# Patient Record
Sex: FEMALE | Race: WHITE
[De-identification: names, ages, dates, MRNs, and addresses within clinical notes are randomized per-mention and may not be internally consistent; named-entity substitution may affect disease eponyms.]

---

## 2018-07-02 ENCOUNTER — HOSPITAL ENCOUNTER (EMERGENCY)
Dept: HOSPITAL 97 - ER | Age: 68
Discharge: HOME | End: 2018-07-02
Payer: COMMERCIAL
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
## 2018-07-02 DIAGNOSIS — Z88.1: ICD-10-CM

## 2018-07-02 DIAGNOSIS — S01.01XA: Primary | ICD-10-CM
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
## 2018-07-02 DIAGNOSIS — E11.9: ICD-10-CM

## 2018-07-02 DIAGNOSIS — F17.210: ICD-10-CM
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
## 2018-07-02 DIAGNOSIS — F41.9: ICD-10-CM

## 2018-07-02 DIAGNOSIS — Z23: ICD-10-CM

## 2018-07-02 PROCEDURE — 99283 EMERGENCY DEPT VISIT LOW MDM: CPT

## 2018-07-02 PROCEDURE — 90714 TD VACC NO PRESV 7 YRS+ IM: CPT

## 2018-07-02 PROCEDURE — 0JQ00ZZ REPAIR SCALP SUBCUTANEOUS TISSUE AND FASCIA, OPEN APPROACH: ICD-10-PCS

## 2018-07-02 NOTE — EDPHYS
Physician Documentation                                                                           

 [facility]                                                                

Name: Selina Arenas                                                                               

Age: 68 yrs                                                                                       

Sex: Female                                                                                       

: 1950                                                                                   

MRN: [id number]                                                                                   

Arrival Date: 2018                                                                          

Time: 17:28                                                                                       

Account#: [id number]                                                                            

Bed 14                                                                                            

Private MD: None, None                                                                            

ED Physician Cuauhtemoc Bauer                                                                      

HPI:                                                                                              

                                                                                             

18:41 This 68 yrs old  Female presents to ER via Ambulatory with complaints of       jr8 

      Laceration To Head.                                                                         

18:41 The patient has a laceration related to: direct blow to head. Onset: The                jr8 

      symptoms/episode began/occurred acutely, yesterday. Associated signs and symptoms: The      

      patient has no apparent associated signs or symptoms. The patient has not experienced       

      similar symptoms in the past. The patient has not recently seen a physician. Patient        

      stated that she was bent over and was going to stand up and hit head on object causing      

      laceration. Incident happened yesterday. Waited until today to come in to see if she        

      needed sutures. .                                                                           

                                                                                                  

Historical:                                                                                       

- Allergies:                                                                                      

17:41 Ciprofloxacin;                                                                            

- Home Meds:                                                                                      

17:41 metformin 500 mg Oral tab 1 tab 2 times per day [Active];                                 

      triamterene-hydrochlorothiazid 75-50 mg Oral tab 1 tab once daily [Active]; Clonazepam      

      Oral [Active]; Flexeril 10 mg Oral tab 1 tab 3 times per day [Active];                      

- PMHx:                                                                                           

17:41 Diabetes - NIDDM; Anxiety;                                                              hj  

- PSHx:                                                                                           

17:41 ; Tonsillectomy;                                                               hj  

                                                                                                  

- Immunization history:: Adult Immunizations up to date.                                          

- Social history:: Smoking status: Patient uses tobacco products, smokes one-half pack            

  cigarettes per day, Patient/guardian denies using alcohol.                                      

- Ebola Screening: : Patient negative for fever greater than or equal to 101.5 degrees            

  Fahrenheit, and additional compatible Ebola Virus Disease symptoms Patient denies               

  exposure to infectious person Patient denies travel to an Ebola-affected area in the            

  21 days before illness onset.                                                                   

                                                                                                  

                                                                                                  

ROS:                                                                                              

18:41 Eyes: Negative for injury, pain, redness, and discharge, ENT: Negative for injury,      jr8 

      pain, and discharge, Neck: Negative for injury, pain, and swelling, Cardiovascular:         

      Negative for chest pain, palpitations, and edema, Respiratory: Negative for shortness       

      of breath, cough, wheezing, and pleuritic chest pain, Abdomen/GI: Negative for              

      abdominal pain, nausea, vomiting, diarrhea, and constipation, Back: Negative for injury     

      and pain, MS/Extremity: Negative for injury and deformity, Neuro: Negative for              

      headache, weakness, numbness, tingling, and seizure.                                        

18:41 Skin: Positive for laceration(s), of the scalp.                                             

                                                                                                  

Exam:                                                                                             

18:41 Eyes:  Pupils equal round and reactive to light, extra-ocular motions intact.  Lids and jr8 

      lashes normal.  Conjunctiva and sclera are non-icteric and not injected.  Cornea within     

      normal limits.  Periorbital areas with no swelling, redness, or edema. ENT:  Nares          

      patent. No nasal discharge, no septal abnormalities noted.  Tympanic membranes are          

      normal and external auditory canals are clear.  Oropharynx with no redness, swelling,       

      or masses, exudates, or evidence of obstruction, uvula midline.  Mucous membranes           

      moist. Neck:  Trachea midline, no thyromegaly or masses palpated, and no cervical           

      lymphadenopathy.  Supple, full range of motion without nuchal rigidity, or vertebral        

      point tenderness.  No Meningismus. Cardiovascular:  Regular rate and rhythm with a          

      normal S1 and S2.  No gallops, murmurs, or rubs.  Normal PMI, no JVD.  No pulse             

      deficits. Respiratory:  Lungs have equal breath sounds bilaterally, clear to                

      auscultation and percussion.  No rales, rhonchi or wheezes noted.  No increased work of     

      breathing, no retractions or nasal flaring. Abdomen/GI:  Soft, non-tender, with normal      

      bowel sounds.  No distension or tympany.  No guarding or rebound.  No evidence of           

      tenderness throughout. Back:  No spinal tenderness.  No costovertebral tenderness.          

      Full range of motion. Skin:  Warm, dry with normal turgor.  Normal color with no            

      rashes, no lesions, and no evidence of cellulitis. MS/ Extremity:  Pulses equal, no         

      cyanosis.  Neurovascular intact.  Full, normal range of motion. Neuro:  Awake and           

      alert, GCS 15, oriented to person, place, time, and situation.  Cranial nerves II-XII       

      grossly intact.  Motor strength 5/5 in all extremities.  Sensory grossly intact.            

      Cerebellar exam normal.  Normal gait.                                                       

18:41 Head/face: Noted is a laceration(s), that is deep, 5 cm(s), of the  scalp, of the           

      semilunar laceration noted with dried blood covering laceration .                           

                                                                                                  

Vital Signs:                                                                                      

17:42  / 81; Pulse 107; Resp 18; Temp 98.1(O); Pulse Ox 98% on R/A; Weight 63.5 kg;     hj  

      Height 5 ft. 11 in. (180.34 cm); Pain 1/10;                                                 

17:42 Body Mass Index 19.52 (63.50 kg, 180.34 cm)                                             hj  

                                                                                                  

Laceration:                                                                                       

19:05 Wound Repair of 5cm ( 2.0in ) subcutaneous laceration to scalp. Irregularly shaped..    jr8 

      Minimal bleeding noted.. Distal neuro/vascular/tendon intact. Anesthesia: Local             

      anesthetic administered with 8 mls of 1% lidocaine. Wound prep: Extensive cleansing         

      with betadine, Wound irrigation with saline, Wound debrided moderately, Wound explored      

      extensively, Copious irrigation. Skin closed with 6 4-0 Prolene using interrupted           

      sutures and sterile technique. Patient tolerated well.                                      

                                                                                                  

MDM:                                                                                              

18:03 Patient medically screened.                                                             jr8 

19:06 Data reviewed: vital signs, nurses notes, and as a result, I will discharge patient.    jr8 

      Data interpreted: Pulse oximetry: on room air is 98 %. Interpretation: normal.              

      Counseling: I had a detailed discussion with the patient and/or guardian regarding: the     

      historical points, exam findings, and any diagnostic results supporting the                 

      discharge/admit diagnosis, the need for outpatient follow up, a family practitioner, to     

      return to the emergency department if symptoms worsen or persist or if there are any        

      questions or concerns that arise at home.                                                   

19:06 ED course: Given size and width of wound. Suture was needed along with proper cleaning  jr8 

      and debridement of wound. Explained to patient risks vs benefit of this. Patient good       

      with decision.                                                                              

                                                                                                  

Administered Medications:                                                                         

19:19 Drug: Tetanus-Diphtheria Toxoid Adult 0.5 ml {: IEV. Exp:         mg2 

      2020. Lot #: [id number]. } Route: IM; Site: right deltoid;                                 

19:22 Follow up: Response: No adverse reaction; Medication administered at discharge.         mg2 

                                                                                                  

                                                                                                  

Disposition:                                                                                      

                                                                                             

15:58 Co-signature as Attending Physician, Cuauhtemoc Bauer MD Available for consultation at    ps1 

      all times..                                                                                 

                                                                                                  

Disposition:                                                                                      

18 19:08 Discharged to Home. Impression: Laceration without foreign body of scalp.          

- Condition is Stable.                                                                            

- Discharge Instructions: Laceration Care, Adult.                                                 

- Prescriptions for Bactrim - 160 mg Oral Tablet - take 1 tablet by ORAL route              

  every 12 hours for 10 days; 20 tablet.                                                          

- Medication Reconciliation Form, Thank You Letter, Antibiotic Education, Prescription            

  Opioid Use form.                                                                                

- Follow up: Private Physician; When: 1 week; Reason: Wound Recheck, Recheck today's              

  complaints, Continuance of care, Staple/Suture removal, Re-evaluation by your                   

  physician.                                                                                      

- Problem is new.                                                                                 

- Symptoms have improved.                                                                         

                                                                                                  

                                                                                                  

                                                                                                  

Signatures:                                                                                       

Rell Mathis PA PA   jr8                                                  

Stephan Burnette, RN                      RN   hj                                                   

Cuauhtemoc Bauer MD MD   ps1                                                  

Pipo Ramires RN                    RN   mg2                                                  

                                                                                                  

Corrections: (The following items were deleted from the chart)                                    

                                                                                             

19:22 19:08 2018 19:08 Discharged to Home. Impression: Laceration without foreign body  mg2 

      of scalp. Condition is Stable. Forms are Medication Reconciliation Form, Thank You          

      Letter, Antibiotic Education, Prescription Opioid Use. Follow up: Private Physician;        

      When: 1 week; Reason: Wound Recheck, Recheck today's complaints, Continuance of care,       

      Staple/Suture removal, Re-evaluation by your physician. Problem is new. Symptoms have       

      improved. jr8                                                                               

                                                                                                  

**************************************************************************************************

## 2018-07-02 NOTE — ER
Nurse's Notes                                                                                     

 [facility]                                                                

Name: Selina Arenas                                                                               

Age: 68 yrs                                                                                       

Sex: Female                                                                                       

: 1950                                                                                   

MRN: [id number]                                                                                   

Arrival Date: 2018                                                                          

Time: 17:28                                                                                       

Account#: [id number]                                                                            

Bed 14                                                                                            

Private MD: None, None                                                                            

Diagnosis: Laceration without foreign body of scalp                                               

                                                                                                  

Presentation:                                                                                     

                                                                                             

17:36 Presenting complaint: Patient states: i fell  midnight at the bathroom and hit my   

      head, had a cut; denies LOC; felt a huge cut on the back of my head; denies headache,       

      denies nausea and vomiting;. Transition of care: patient was not received from another      

      setting of care. Complicating Factors: There are no complicating factors for this           

      patient. Onset of symptoms was 2018. Risk Assessment: Do you want to hurt          

      yourself or someone else? Patient reports no desire to harm self or others. Initial         

      Sepsis Screen: Does the patient meet any 2 criteria? No. Patient's initial sepsis           

      screen is negative. Does the patient have a suspected source of infection? No.              

      Patient's initial sepsis screen is negative. Care prior to arrival: None.                   

17:36 Method Of Arrival: Ambulatory                                                             

17:36 Acuity: EDITH 4                                                                           hj  

                                                                                                  

Triage Assessment:                                                                                

17:42 General: Appears in no apparent distress. uncomfortable, Behavior is calm, cooperative, hj  

      appropriate for age. Pain: Complains of pain in scalp. Injury Description: Laceration.      

                                                                                                  

Historical:                                                                                       

- Allergies:                                                                                      

17:41 Ciprofloxacin;                                                                            

- Home Meds:                                                                                      

17:41 metformin 500 mg Oral tab 1 tab 2 times per day [Active];                                 

      triamterene-hydrochlorothiazid 75-50 mg Oral tab 1 tab once daily [Active]; Clonazepam      

      Oral [Active]; Flexeril 10 mg Oral tab 1 tab 3 times per day [Active];                      

- PMHx:                                                                                           

17:41 Diabetes - NIDDM; Anxiety;                                                                

- PSHx:                                                                                           

17:41 ; Tonsillectomy;                                                               hj  

                                                                                                  

- Immunization history:: Adult Immunizations up to date.                                          

- Social history:: Smoking status: Patient uses tobacco products, smokes one-half pack            

  cigarettes per day, Patient/guardian denies using alcohol.                                      

- Ebola Screening: : Patient negative for fever greater than or equal to 101.5 degrees            

  Fahrenheit, and additional compatible Ebola Virus Disease symptoms Patient denies               

  exposure to infectious person Patient denies travel to an Ebola-affected area in the            

  21 days before illness onset.                                                                   

                                                                                                  

                                                                                                  

Screenin:42 Abuse screen: Denies threats or abuse. Denies injuries from another. Nutritional        hj  

      screening: No deficits noted. Tuberculosis screening: No symptoms or risk factors           

      identified. Fall Risk None identified.                                                      

                                                                                                  

Assessment:                                                                                       

17:42 Musculoskeletal: Reports pain in scalp.                                                 hj  

19:20 Injury Description: Laceration is clean, 0.5 to 2.5 cm long.                            mg2 

                                                                                                  

Vital Signs:                                                                                      

17:42  / 81; Pulse 107; Resp 18; Temp 98.1(O); Pulse Ox 98% on R/A; Weight 63.5 kg;     hj  

      Height 5 ft. 11 in. (180.34 cm); Pain 1/10;                                                 

17:42 Body Mass Index 19.52 (63.50 kg, 180.34 cm)                                             hj  

                                                                                                  

ED Course:                                                                                        

17:28 Patient arrived in ED.                                                                  mr  

17:28 None, None is Private Physician.                                                        mr  

17:39 Triage completed.                                                                       hj  

17:42 Arm band placed on right wrist.                                                         hj  

17:42 Patient has correct armband on for positive identification. Bed in low position. Call     

      light in reach. Side rails up X 1. Adult w/ patient.                                        

18:03 [facility]                                                               jrLaureano 

18:03 Cuauhtemoc Bauer MD is Attending Physician.                                             jrLaureano 

19:06 Pipo Ramires, MIKHAIL is Primary Nurse.                                                  mg2 

19:21 No provider procedures requiring assistance completed. Patient did not have IV access   mg2 

      during this emergency room visit.                                                           

                                                                                                  

Administered Medications:                                                                         

19:19 Drug: Tetanus-Diphtheria Toxoid Adult 0.5 ml {: [employer]. Exp:         mg2 

      2020. Lot #: [id number]. } Route: IM; Site: right deltoid;                                 

19:22 Follow up: Response: No adverse reaction; Medication administered at discharge.         mg2 

                                                                                                  

                                                                                                  

Outcome:                                                                                          

19:08 Discharge ordered by MD.                                                                jrLaureano 

19:21 Discharged to home ambulatory, with family.                                             mg2 

19:21 Condition: good                                                                             

19:21 Discharge instructions given to patient, family, Instructed on discharge instructions,      

      follow up and referral plans. medication usage, Demonstrated understanding of               

      instructions, follow-up care, medications, Prescriptions given X 1.                         

19:22 Patient left the ED.                                                                    mg2 

                                                                                                  

Signatures:                                                                                       

Alyssa Craven                                mr                                                   

RoszakRell PA PA   pam Guthrieuin, Stephan, RN                      RN   hj                                                   

Pipo Ramires RN                    RN   mg2                                                  

                                                                                                  

Corrections: (The following items were deleted from the chart)                                    

17:45 17:42 Pulse 107bpm; Resp 18bpm; Pulse Ox 98% RA; Temp 98.1F Oral; 63.5 kg; Height 5 ft. hj  

      11 in.; BMI: 19.5; Pain 1/10; hj                                                            

                                                                                                  

**************************************************************************************************

## 2018-10-01 ENCOUNTER — HOSPITAL ENCOUNTER (EMERGENCY)
Dept: HOSPITAL 97 - ER | Age: 68
Discharge: HOME | End: 2018-10-01
Payer: COMMERCIAL

## 2018-10-01 DIAGNOSIS — F41.9: ICD-10-CM

## 2018-10-01 DIAGNOSIS — L03.126: ICD-10-CM

## 2018-10-01 DIAGNOSIS — L03.116: Primary | ICD-10-CM

## 2018-10-01 DIAGNOSIS — Z88.1: ICD-10-CM

## 2018-10-01 DIAGNOSIS — F17.210: ICD-10-CM

## 2018-10-01 DIAGNOSIS — E11.9: ICD-10-CM

## 2018-10-01 LAB
ALBUMIN SERPL BCP-MCNC: 3.5 G/DL (ref 3.4–5)
ALP SERPL-CCNC: 82 U/L (ref 45–117)
ALT SERPL W P-5'-P-CCNC: 18 U/L (ref 12–78)
AST SERPL W P-5'-P-CCNC: 16 U/L (ref 15–37)
BUN BLD-MCNC: 20 MG/DL (ref 7–18)
CKMB CREATINE KINASE MB: 1.5 NG/ML (ref 0.3–3.6)
GLUCOSE SERPLBLD-MCNC: 159 MG/DL (ref 74–106)
HCT VFR BLD CALC: 39.4 % (ref 36–45)
INR BLD: 0.97
LIPASE SERPL-CCNC: 139 U/L (ref 73–393)
LYMPHOCYTES # SPEC AUTO: 1.9 K/UL (ref 0.7–4.9)
MCH RBC QN AUTO: 32.9 PG (ref 27–35)
MCV RBC: 95.1 FL (ref 80–100)
PMV BLD: 8 FL (ref 7.6–11.3)
POTASSIUM SERPL-SCNC: 4 MMOL/L (ref 3.5–5.1)
RBC # BLD: 4.15 M/UL (ref 3.86–4.86)
TROPONIN (EMERG DEPT USE ONLY): < 0.02 NG/ML (ref 0–0.04)
UA COMPLETE W REFLEX CULTURE PNL UR: (no result)

## 2018-10-01 PROCEDURE — 85610 PROTHROMBIN TIME: CPT

## 2018-10-01 PROCEDURE — 99285 EMERGENCY DEPT VISIT HI MDM: CPT

## 2018-10-01 PROCEDURE — 87040 BLOOD CULTURE FOR BACTERIA: CPT

## 2018-10-01 PROCEDURE — 93971 EXTREMITY STUDY: CPT

## 2018-10-01 PROCEDURE — 82550 ASSAY OF CK (CPK): CPT

## 2018-10-01 PROCEDURE — 80048 BASIC METABOLIC PNL TOTAL CA: CPT

## 2018-10-01 PROCEDURE — 84145 PROCALCITONIN (PCT): CPT

## 2018-10-01 PROCEDURE — 36415 COLL VENOUS BLD VENIPUNCTURE: CPT

## 2018-10-01 PROCEDURE — 93005 ELECTROCARDIOGRAM TRACING: CPT

## 2018-10-01 PROCEDURE — 83605 ASSAY OF LACTIC ACID: CPT

## 2018-10-01 PROCEDURE — 80076 HEPATIC FUNCTION PANEL: CPT

## 2018-10-01 PROCEDURE — 84484 ASSAY OF TROPONIN QUANT: CPT

## 2018-10-01 PROCEDURE — 85025 COMPLETE CBC W/AUTO DIFF WBC: CPT

## 2018-10-01 PROCEDURE — 96365 THER/PROPH/DIAG IV INF INIT: CPT

## 2018-10-01 PROCEDURE — 85730 THROMBOPLASTIN TIME PARTIAL: CPT

## 2018-10-01 PROCEDURE — 96375 TX/PRO/DX INJ NEW DRUG ADDON: CPT

## 2018-10-01 PROCEDURE — 81015 MICROSCOPIC EXAM OF URINE: CPT

## 2018-10-01 PROCEDURE — 82553 CREATINE MB FRACTION: CPT

## 2018-10-01 PROCEDURE — 83690 ASSAY OF LIPASE: CPT

## 2018-10-01 NOTE — RAD REPORT
EXAM DESCRIPTION:  US - Extremity Venous Uni Ltd - 10/1/2018 5:43 pm

 

CLINICAL HISTORY:  Left leg pain and swelling

 

Preliminary findings provided at the time of the study.

 

COMPARISON:  None.

 

TECHNIQUE:  Real-time sonographic evaluation of the left lower extremity deep venous system was perfo
rmed.

 

FINDINGS:  Normal compressibility, flow augmentation, phasic flow and spontaneous flow are identified
 in the left lower extremity common femoral, superficial femoral, popliteal and posterior tibial vein
s. No intraluminal filling defects seen.

 

IMPRESSION:  No DVT in the left lower extremity.

## 2018-10-01 NOTE — ER
Nurse's Notes                                                                                     

 [facility]                                                                

Name: Selina Arenas                                                                               

Age: 68 yrs                                                                                       

Sex: Female                                                                                       

: 1950                                                                                   

MRN: [id number]                                                                                   

Arrival Date: 10/01/2018                                                                          

Time: 15:35                                                                                       

Account#: [id number]                                                                            

Bed 15                                                                                            

Private MD: None, None                                                                            

Diagnosis: Cellulitis and acute lymphangitis of other parts of limb                               

                                                                                                  

Presentation:                                                                                     

10/01                                                                                             

15:46 Presenting complaint: Patient states: Redness and swelling to top of left ankle and     hb  

      foot x 2 days. Transition of care: patient was not received from another setting of         

      care. Onset of symptoms was 2018. Risk Assessment: Do you want to hurt        

      yourself or someone else? Patient reports no desire to harm self or others. Care prior      

      to arrival: None.                                                                           

15:46 Method Of Arrival: Ambulatory                                                           hb  

15:46 Acuity: EDITH 2                                                                           hb  

15:59 Initial Sepsis Screen: Does the patient meet any 2 criteria? RR > 20 per min. HR > 90   tw2 

      bpm. Does the patient have a suspected source of infection? Yes: Skin breakdown/wound       

      If YES to both, name of provider notified: Kolby Dubose MD                              

                                                                                                  

Historical:                                                                                       

- Allergies:                                                                                      

15:49 Ciprofloxacin;                                                                          hb  

- Home Meds:                                                                                      

15:49 Clonazepam Oral [Active]; Flexeril 10 mg Oral tab 1 tab 3 times per day [Active];       hb  

      metformin 500 mg Oral tab 1 tab 2 times per day [Active];                                   

      triamterene-hydrochlorothiazid 75-50 mg Oral tab 1 tab once daily [Active];                 

- PMHx:                                                                                           

15:49 Diabetes - NIDDM; Anxiety;                                                              hb  

- PSHx:                                                                                           

15:49 ; Tonsillectomy;                                                               hb  

                                                                                                  

- Immunization history:: Adult Immunizations up to date.                                          

- Social history:: Smoking status: Patient uses tobacco products, smokes one-half pack            

  cigarettes per day, Patient/guardian denies using alcohol, street drugs,                        

  Patient/guardian denies using The patient lives with family.                                    

- Ebola Screening: : No symptoms or risks identified at this time.                                

- Family history:: not pertinent.                                                                 

                                                                                                  

                                                                                                  

Screening:                                                                                        

15:58 Abuse screen: Denies threats or abuse. Nutritional screening: No deficits noted.        tw2 

      Tuberculosis screening: No symptoms or risk factors identified. Fall Risk None              

      identified.                                                                                 

                                                                                                  

Assessment:                                                                                       

16:08 Reassessment: Dr Dubose at bedside at this time. General: Appears in no apparent       tw2 

      distress. obese, Behavior is anxious. Pain: Complains of pain in b/l feet. Neuro: Level     

      of Consciousness is awake, alert, obeys commands, Oriented to person, place, time,          

      situation. Cardiovascular: Denies chest pain, shortness of breath, Heart tones S1 S2        

      Capillary refill < 3 seconds Patient's skin is warm and dry. Respiratory: Airway is         

      patent Respiratory effort is even, unlabored, Respiratory pattern is regular,               

      symmetrical, Breath sounds are clear bilaterally. GI: No signs and/or symptoms were         

      reported involving the gastrointestinal system. Abdomen is round obese, Bowel sounds        

      present X 4 quads. : No signs and/or symptoms were reported regarding the                 

      genitourinary system. EENT: No signs and/or symptoms were reported regarding the EENT       

      system. Derm: "psoriasis all over but this has gotten really red and irritated since i      

      flew for 6 hours the other day". Musculoskeletal: Range of motion: intact in all            

      extremities.                                                                                

16:13 Cardiovascular: Edema is 1+ to left foot, left toes, right foot and right toes.         tw2 

17:31 Reassessment: Patient appears in no apparent distress at this time. No changes from     tw2 

      previously documented assessment. Patient and/or family updated on plan of care and         

      expected duration. Pain level reassessed. Patient is alert, oriented x 3, equal             

      unlabored respirations, skin warm/dry/pink.                                                 

17:50 Reassessment: Patient and/or family updated on plan of care and expected duration. Pain aa5 

      level reassessed. Patient is alert, oriented x 3, equal unlabored respirations, skin        

      warm/dry/pink. Patient denies pain at this time. Pt awaiting US results at this time. .     

17:50 Cardiovascular: Rhythm is atrial fibrillation.                                          aa5 

18:45 Reassessment: Patient and/or family updated on plan of care and expected duration. Pain aa5 

      level reassessed. Patient is alert, oriented x 3, equal unlabored respirations, skin        

      warm/dry/pink. Cardiovascular: Rhythm is atrial fibrillation.                               

19:33 Reassessment: Patient appears in no apparent distress at this time. Patient is alert,   aa1 

      oriented x 3, equal unlabored respirations, skin warm/dry/pink. ERP at bedside              

      discussing results with pt. Verbalizes understanding of d/c \T\ f/u instructions; denies    

      questions or concerns at this time Patient denies pain at this time.                        

                                                                                                  

Vital Signs:                                                                                      

15:47  / 95; Pulse 121; Resp 20; Temp 98.8; Pulse Ox 98% on R/A; Pain 0/10;             hb  

16:38 Weight 113.4 kg (R);                                                                    tw2 

16:38  / 76; Pulse 105; Resp 23; Pulse Ox 95% ;                                         tw2 

17:31  / 99; Pulse 93; Resp 19; Pulse Ox 95% on R/A;                                    tw2 

19:33  / 91; Pulse 98; Resp 18; Pulse Ox 96% on R/A; Pain 0/10;                         aa1 

                                                                                                  

ED Course:                                                                                        

15:35 Patient arrived in ED.                                                                  sb2 

15:36 None, None is Private Physician.                                                        sb2 

15:47 Triage completed.                                                                       hb  

15:48 Arm band placed on right wrist.                                                         hb  

15:58 Sandy Zelaya RN is Primary Nurse.                                                        tw2 

15:59 Kolby Dubose MD is Attending Physician.                                           ma2 

15:59 Bed in low position. Call light in reach. Cardiac monitor on. Pulse ox on. NIBP on.     tw2 

16:30 Initial lab(s) drawn, by me, sent to lab. First set of blood cultures drawn by me,      [facility] 

      Second set of blood cultures drawn by me.                                                   

16:44 Inserted saline lock: 20 gauge in right antecubital area, using aseptic technique.      mh5 

      Blood collected.                                                                            

16:47 Basic Metabolic Panel Sent.                                                             mh5 

16:47 Blood Culture Adult (2) Sent.                                                           mh5 

16:47 CBC with Diff Sent.                                                                     mh5 

16:47 Ckmb Sent.                                                                              mh5 

16:47 CPK Sent.                                                                               mh5 

16:47 Lactate Sent.                                                                           mh5 

16:47 LFT's Sent.                                                                             mh5 

16:47 Lipase Sent.                                                                            mh5 

16:48 Ptt, Activated Sent.                                                                    mh5 

16:48 Troponin (emerg Dept Use Only) Sent.                                                    mh5 

17:31 Report given to MIKHAIL Vargas.                                                               tw2 

17:45 Extremity Venous Uni Ltd US In Process Unspecified.                                     EDMS

18:28 Urine collected: clean catch specimen, cloudy, sediment noted.                          dh3 

19:10 Report given to MIKHAIL Fajardo.                                                             aa5 

19:17 EKG done, by ED staff, reviewed by Kolby Dubose MD.                                 dh3 

19:33 No provider procedures requiring assistance completed. IV discontinued, intact,         aa1 

      bleeding controlled, No redness/swelling at site. Pressure dressing applied.                

                                                                                                  

Administered Medications:                                                                         

16:53 Drug: NS 0.9% (30 ml/kg) 30 ml/kg {Note: 2L NS per Dr. Dubose at this time..} Route:   tw2 

      IV; Rate: bolus; Site: right antecubital;                                                   

17:04 Drug: Clindamycin 600 mg Route: IVPB; Infused Over: 30 mins; Site: right antecubital;   tw2 

17:32 Follow up: Response: No adverse reaction; IV Status: Completed infusion                 tw2 

                                                                                                  

                                                                                                  

Outcome:                                                                                          

19:19 Discharge ordered by MD.                                                                ma2 

19:33 Discharged to home ambulatory, with significant other.                                  aa1 

19:33 Condition: good                                                                             

19:33 Discharge instructions given to patient, significant other, Instructed on discharge         

      instructions, follow up and referral plans. medication usage, Demonstrated                  

      understanding of instructions, follow-up care, medications, Prescriptions given X 2.        

19:37 Patient left the ED.                                                                    aa1 

                                                                                                  

Signatures:                                                                                       

Dispatcher [employer]                           EDMS                                                 

Tana Aceves RN                        RN   aa1                                                  

Alicia De Los Santos RN                     RN   aa5                                                  

Varsha Sr RN RN hb Wise, Tara, RN RN   tw2                                                  

Alyssa Armstrong                              5                                                  

Mona Sotelo                              3                                                  

Kolby Dubose MD MD   ma2                                                  

Irasema Sexton                               sb2                                                  

                                                                                                  

Corrections: (The following items were deleted from the chart)                                    

15:48 15:46 Acuity: EDITH 3 hb                                                                  hb  

                                                                                                  

**************************************************************************************************

## 2018-10-01 NOTE — EDPHYS
Physician Documentation                                                                           

 [facility]                                                                

Name: Selina Arenas                                                                               

Age: 68 yrs                                                                                       

Sex: Female                                                                                       

: 1950                                                                                   

MRN: [id number]                                                                                   

Arrival Date: 10/01/2018                                                                          

Time: 15:35                                                                                       

Account#: [id number]                                                                            

Bed 15                                                                                            

Private MD: None, None                                                                            

ED Physician Kolby Dubose                                                                    

HPI:                                                                                              

10/01                                                                                             

19:20 This 68 yrs old  Female presents to ER via Ambulatory with complaints of Foot  ma2 

      infection.                                                                                  

17:09 The patient presents with cellulitis of the left leg. Description: hot, raised,         ma2 

      swollen. Onset: The symptoms/episode began/occurred gradually, 1 day(s) ago. Possible       

      cause(s): unknown. Associated signs and symptoms: Pertinent positives: erythema,            

      swelling, Pertinent negatives: fever, headache. Severity of symptoms: At their worst        

      the symptoms were mild, moderate, in the emergency department the symptoms are              

      unchanged. The patient has not experienced similar symptoms in the past. hx of DM here      

      with left leg swelling and redness x 1 day, had a flight from [place] last week, n           

      ofever or sob .                                                                             

                                                                                                  

Historical:                                                                                       

- Allergies:                                                                                      

15:49 Ciprofloxacin;                                                                          hb  

- Home Meds:                                                                                      

15:49 Clonazepam Oral [Active]; Flexeril 10 mg Oral tab 1 tab 3 times per day [Active];       hb  

      metformin 500 mg Oral tab 1 tab 2 times per day [Active];                                   

      triamterene-hydrochlorothiazid 75-50 mg Oral tab 1 tab once daily [Active];                 

- PMHx:                                                                                           

15:49 Diabetes - NIDDM; Anxiety;                                                              hb  

- PSHx:                                                                                           

15:49 ; Tonsillectomy;                                                               hb  

                                                                                                  

- Immunization history:: Adult Immunizations up to date.                                          

- Social history:: Smoking status: Patient uses tobacco products, smokes one-half pack            

  cigarettes per day, Patient/guardian denies using alcohol, street drugs,                        

  Patient/guardian denies using The patient lives with family.                                    

- Ebola Screening: : No symptoms or risks identified at this time.                                

- Family history:: not pertinent.                                                                 

                                                                                                  

                                                                                                  

ROS:                                                                                              

17:11 Constitutional: Negative for fever, chills, and weight loss, Eyes: Negative for injury, ma2 

      pain, redness, and discharge, Cardiovascular: Negative for chest pain, palpitations,        

      and edema, Respiratory: Negative for shortness of breath, cough, wheezing, and              

      pleuritic chest pain, Abdomen/GI: Negative for abdominal pain, nausea, diarrhea, and        

      constipation.                                                                               

17:11 MS/extremity: Positive for pain, swelling, warmth, Negative for injury or acute             

      deformity, bite.                                                                            

17:11 All other systems are negative.                                                             

                                                                                                  

Exam:                                                                                             

17:11 Constitutional:  This is a well developed, well nourished patient who is awake, alert,  ma2 

      and in no acute distress. Head/Face:  Normocephalic, atraumatic. Chest/axilla:  Normal      

      chest wall appearance and motion.  Nontender with no deformity.  No lesions are             

      appreciated. Cardiovascular:  Regular rate and rhythm with a normal S1 and S2.  No          

      gallops, murmurs, or rubs.  Normal PMI, no JVD.  No pulse deficits. Respiratory:  Lungs     

      have equal breath sounds bilaterally, clear to auscultation and percussion.  No rales,      

      rhonchi or wheezes noted.  No increased work of breathing, no retractions or nasal          

      flaring. Abdomen/GI:  Soft, non-tender, with normal bowel sounds.  No distension or         

      tympany.  No guarding or rebound.  No evidence of tenderness throughout.                    

17:11 Musculoskeletal/extremity: ROM: no acute changes, Circulation is intact in all              

      extremities. Sensation intact. Compartment Syndrome exam of affected extremity: is          

      normal. left foot dorsal erythema, edema and warmth, no fluctuance, area is 5x5 cm,         

      extend to the ankle, ankle joint wnl, rom is full and pain free.                            

                                                                                                  

Vital Signs:                                                                                      

15:47  / 95; Pulse 121; Resp 20; Temp 98.8; Pulse Ox 98% on R/A; Pain 0/10;             hb  

16:38 Weight 113.4 kg (R);                                                                    tw2 

16:38  / 76; Pulse 105; Resp 23; Pulse Ox 95% ;                                         tw2 

17:31  / 99; Pulse 93; Resp 19; Pulse Ox 95% on R/A;                                    tw2 

19:33  / 91; Pulse 98; Resp 18; Pulse Ox 96% on R/A; Pain 0/10;                         aa1 

                                                                                                  

MDM:                                                                                              

16:03 Patient medically screened.                                                             ma2 

17:11 Differential diagnosis: cellulitis, insect bite, DVT, cellulitis, sepsis .              ma2 

19:09 Data reviewed: vital signs, nurses notes.                                               ma2 

19:18 Counseling: I had a detailed discussion with the patient and/or guardian regarding: the ma2 

      historical points, exam findings, and any diagnostic results supporting the                 

      discharge/admit diagnosis, the presence of at least one elevated blood pressure reading     

      (>120/80) during this emergency department visit, the need for outpatient follow up.        

      Response to treatment: the patient's symptoms have mildly improved after treatment. ED      

      course: she is mildly tachy d/t anxiety, appropriate for outpatient management .            

                                                                                                  

10/01                                                                                             

16:18 Order name: Basic Metabolic Panel; Complete Time: 17:39                                 ma2 

10/01                                                                                             

16:18 Order name: Blood Culture Adult (2)                                                     ma2 

10/01                                                                                             

16:18 Order name: CBC with Diff; Complete Time: 17:39                                         ma2 

10/01                                                                                             

16:18 Order name: Ckmb; Complete Time: 17:39                                                  ma2 

10/01                                                                                             

16:18 Order name: CPK; Complete Time: 17:39                                                   ma2 

10/01                                                                                             

16:18 Order name: Lactate; Complete Time: 17:39                                               ma2 

10/01                                                                                             

16:18 Order name: LFT's; Complete Time: 17:39                                                 ma2 

10/01                                                                                             

16:18 Order name: Lipase; Complete Time: 17:39                                                ma2 

10/01                                                                                             

16:18 Order name: Procalcitonin; Complete Time: 17:39                                         ma2 

10/01                                                                                             

16:18 Order name: Protime (+inr); Complete Time: 17:39                                        ma2 

10/01                                                                                             

16:18 Order name: Ptt, Activated; Complete Time: 17:39                                        ma2 

10/01                                                                                             

16:18 Order name: Troponin (emerg Dept Use Only); Complete Time: 17:39                        ma2 

10/01                                                                                             

16:18 Order name: Urine Microscopic Only                                                      ma2 

10/01                                                                                             

16:18 Order name: Extremity Venous Uni Ltd US                                                 ma2 

10/01                                                                                             

16:18 Order name: Cardiac monitoring; Complete Time: 16:59                                    ma2 

10/01                                                                                             

16:18 Order name: EKG - Nurse/Tech; Complete Time: 16:59                                      ma2 

10/01                                                                                             

16:18 Order name: IV Saline Lock - Large Bore; Complete Time: 16:59                           ma2 

10/01                                                                                             

16:18 Order name: Labs collected and sent; Complete Time: 16:48                               ma2 

10/01                                                                                             

16:18 Order name: O2 Per Protocol; Complete Time: 16:59                                       ma2 

10/01                                                                                             

16:18 Order name: O2 Sat Monitoring; Complete Time: 16:59                                     ma2 

10/01                                                                                             

16:18 Order name: Urine Dipstick-Ancillary (obtain specimen); Complete Time: 18:28            [facility] 

                                                                                                  

Administered Medications:                                                                         

16:53 Drug: NS 0.9% (30 ml/kg) 30 ml/kg {Note: 2L NS per Dr. Dubose at this time..} Route:   tw2 

      IV; Rate: bolus; Site: right antecubital;                                                   

17:04 Drug: Clindamycin 600 mg Route: IVPB; Infused Over: 30 mins; Site: right antecubital;   tw2 

17:32 Follow up: Response: No adverse reaction; IV Status: Completed infusion                 tw2 

                                                                                                  

                                                                                                  

Disposition:                                                                                      

10/01/18 19:19 Discharged to Home. Impression: Cellulitis and acute lymphangitis of other         

  parts of limb.                                                                                  

- Condition is Stable.                                                                            

- Discharge Instructions: Cellulitis, Adult.                                                      

- Prescriptions for Augmentin 875- 125 mg Oral Tablet - take 1 tablet by ORAL route               

  every 12 hours for 10 days; 20 tablet. Clindamycin HCl 300 mg Oral Capsule - take 1             

  capsule by ORAL route every 6 hours for 10 days; 40 capsule.                                    

- Medication Reconciliation Form, Thank You Letter, Antibiotic Education, Prescription            

  Opioid Use form.                                                                                

- Follow up: Private Physician; When: Tomorrow; Reason: Continuance of care.                      

- Problem is new.                                                                                 

- Symptoms are unchanged.                                                                         

                                                                                                  

                                                                                                  

                                                                                                  

Signatures:                                                                                       

Dispatcher [employer]                           Tana Burnett RN                        RN   aa1                                                  

Varsha Sr RN RN                                                      

Sandy Zelaya RN                          RN   2                                                  

Kolby Dubose MD MD   [facility]                                                  

                                                                                                  

Corrections: (The following items were deleted from the chart)                                    

16:59 16:18 Accucheck ordered. [place] [id number] 

19:37 19:19 10/01/2018 19:19 Discharged to Home. Impression: Cellulitis and acute             aa1 

      lymphangitis of other parts of limb. Condition is Stable. Forms are Medication              

      Reconciliation Form, Thank You Letter, Antibiotic Education, Prescription Opioid Use.       

      Follow up: Private Physician; When: Tomorrow; Reason: Continuance of care. Problem is       

      new. Symptoms are unchanged. [facility]                                                            

                                                                                                  

**************************************************************************************************

## 2018-10-02 NOTE — EKG
Test Date:    2018-10-01               Test Time:    19:11:08

Technician:   JASON                                     

                                                     

MEASUREMENT RESULTS:                                       

Intervals:                                           

Rate:         107                                    

WA:                                                  

QRSD:         94                                     

QT:           314                                    

QTc:          419                                    

Axis:                                                

P:                                                   

WA:                                                  

QRS:          -13                                    

T:            40                                     

                                                     

INTERPRETIVE STATEMENTS:                                       

                                                     

Atrial fibrillation with rapid ventricular response

Incomplete right bundle branch block

Possible Anteroseptal infarct, age undetermined

Abnormal ECG

No previous ECG available for comparison



Electronically Signed On 10-02-18 07:23:37 CDT by Barak Mooney

## 2018-12-07 ENCOUNTER — HOSPITAL ENCOUNTER (EMERGENCY)
Dept: HOSPITAL 97 - ER | Age: 68
Discharge: HOME | End: 2018-12-07
Payer: COMMERCIAL

## 2018-12-07 DIAGNOSIS — I89.1: ICD-10-CM

## 2018-12-07 DIAGNOSIS — L03.116: Primary | ICD-10-CM

## 2018-12-07 LAB
BUN BLD-MCNC: 19 MG/DL (ref 7–18)
GLUCOSE SERPLBLD-MCNC: 156 MG/DL (ref 74–106)
HCT VFR BLD CALC: 41.4 % (ref 36–45)
LYMPHOCYTES # SPEC AUTO: 2.1 K/UL (ref 0.7–4.9)
MCH RBC QN AUTO: 33.6 PG (ref 27–35)
MCV RBC: 96.8 FL (ref 80–100)
PMV BLD: 7.6 FL (ref 7.6–11.3)
POTASSIUM SERPL-SCNC: 3.8 MMOL/L (ref 3.5–5.1)
RBC # BLD: 4.28 M/UL (ref 3.86–4.86)
UA COMPLETE W REFLEX CULTURE PNL UR: (no result)

## 2018-12-07 PROCEDURE — 81003 URINALYSIS AUTO W/O SCOPE: CPT

## 2018-12-07 PROCEDURE — 85025 COMPLETE CBC W/AUTO DIFF WBC: CPT

## 2018-12-07 PROCEDURE — 93971 EXTREMITY STUDY: CPT

## 2018-12-07 PROCEDURE — 80048 BASIC METABOLIC PNL TOTAL CA: CPT

## 2018-12-07 PROCEDURE — 96361 HYDRATE IV INFUSION ADD-ON: CPT

## 2018-12-07 PROCEDURE — 87070 CULTURE OTHR SPECIMN AEROBIC: CPT

## 2018-12-07 PROCEDURE — 99284 EMERGENCY DEPT VISIT MOD MDM: CPT

## 2018-12-07 PROCEDURE — 81015 MICROSCOPIC EXAM OF URINE: CPT

## 2018-12-07 PROCEDURE — 87040 BLOOD CULTURE FOR BACTERIA: CPT

## 2018-12-07 PROCEDURE — 36415 COLL VENOUS BLD VENIPUNCTURE: CPT

## 2018-12-07 PROCEDURE — 96365 THER/PROPH/DIAG IV INF INIT: CPT

## 2018-12-07 PROCEDURE — 84145 PROCALCITONIN (PCT): CPT

## 2018-12-07 PROCEDURE — 87205 SMEAR GRAM STAIN: CPT

## 2018-12-07 NOTE — ER
Nurse's Notes                                                                                     

 [facility]                                                                

Name: Selina Arenas                                                                               

Age: 68 yrs                                                                                       

Sex: Female                                                                                       

: 1950                                                                                   

MRN: [id number]                                                                                   

Arrival Date: 2018                                                                          

Time: 15:37                                                                                       

Account#: [id number]                                                                            

Bed 18                                                                                            

Private MD: out of town, doctor                                                                   

Diagnosis: Cellulitis and acute lymphangitis of other parts of limb-Left lower leg and foot       

                                                                                                  

Presentation:                                                                                     

                                                                                             

15:45 Presenting complaint: LLE pain and swelling after mechanical fall from standing 5 days  hb  

      ago. Transition of care: patient was not received from another setting of care. Onset       

      of symptoms was 2018. Risk Assessment: Do you want to hurt yourself or         

      someone else? Patient reports no desire to harm self or others. Care prior to arrival:      

      None.                                                                                       

15:45 Method Of Arrival: Ambulatory                                                           hb  

15:45 Acuity: EDITH 3                                                                           hb  

16:24 Initial Sepsis Screen: Does the patient meet any 2 criteria? HR > 90 bpm. No. Patient's em  

      initial sepsis screen is negative. Does the patient have a suspected source of              

      infection? Yes: Skin breakdown/wound.                                                       

                                                                                                  

Triage Assessment:                                                                                

19:12 General: Appears in no apparent distress. Behavior is calm, cooperative, appropriate    jd3 

      for age.                                                                                    

                                                                                                  

Historical:                                                                                       

- Allergies:                                                                                      

15:48 Ciprofloxacin;                                                                          hb  

- PMHx:                                                                                           

15:48 Anxiety; Diabetes - NIDDM; Psoriasis;                                                   hb  

- PSHx:                                                                                           

15:48 ; Tonsillectomy;                                                               hb  

                                                                                                  

- Immunization history:: Adult Immunizations up to date.                                          

- Social history:: Smoking status: Patient/guardian denies using tobacco.                         

- Ebola Screening: : No symptoms or risks identified at this time.                                

                                                                                                  

                                                                                                  

Screenin:24 Abuse screen: Denies threats or abuse. Nutritional screening: No deficits noted.        em  

      Tuberculosis screening: No symptoms or risk factors identified. Fall Risk Ambulatory        

      Aid- Crutches/Cane/Walker (15 pts).                                                         

                                                                                                  

Assessment:                                                                                       

16:30 General: Appears in no apparent distress. comfortable, Behavior is calm, cooperative,   em  

      appropriate for age, Denies fever. Pain: Denies pain. Neuro: Level of Consciousness is      

      awake, alert, obeys commands, Oriented to person, place, time, situation.                   

      Cardiovascular: Patient's skin is warm and dry. Respiratory: Airway is patent               

      Respiratory effort is even, unlabored, Respiratory pattern is regular, symmetrical. GI:     

      Patient currently denies diarrhea, nausea, vomiting. : No signs and/or symptoms were      

      reported regarding the genitourinary system. EENT: No signs and/or symptoms were            

      reported regarding the EENT system. Derm: Skin is intact, Wound noted right foot and        

      left foot Wound is weeping and swelling noted to bruna. feet Rash noted that is.              

      Musculoskeletal: Range of motion: intact in all extremities.                                

17:30 Reassessment: Patient appears in no apparent distress at this time. Patient and/or      em  

      family updated on plan of care and expected duration. Pain level reassessed. Patient is     

      alert, oriented x 3, equal unlabored respirations, skin warm/dry/pink.                      

18:30 Reassessment: Patient appears in no apparent distress at this time. Patient and/or      em  

      family updated on plan of care and expected duration. Pain level reassessed. Patient is     

      alert, oriented x 3, equal unlabored respirations, skin warm/dry/pink.                      

18:44 Reassessment: Patient appears in no apparent distress at this time. i agree with above  iw  

      assessment by Tuan Billings LVN.                                                             

19:11 Reassessment: Patient appears in no apparent distress at this time. No changes from     jd3 

      previously documented assessment. Patient and/or family updated on plan of care and         

      expected duration. Pain level reassessed. Patient is alert, oriented x 3, equal             

      unlabored respirations, skin warm/dry/pink. Pain: Denies pain.                              

                                                                                                  

Vital Signs:                                                                                      

15:45  / 105; Pulse 106; Resp 20; Temp 98.0(TE); Pulse Ox 95% on R/A; Pain 3/10;        hb  

16:30  / 82; Pulse 111; Resp 19; Pulse Ox 96% on R/A;                                   em  

17:30  / 78; Pulse 122; Resp 18; Temp 99.5(O); Pulse Ox 100% on R/A; Pain 0/10;         em  

19:11 Pulse 107; Resp 17 S; Pulse Ox 100% on R/A;                                             jd3 

                                                                                                  

ED Course:                                                                                        

15:37 Patient arrived in ED.                                                                  mr  

15:38 out of town, doctor is Private Physician.                                               mr  

15:45 Triage completed.                                                                       hb  

15:47 Arm band placed on left wrist.                                                          hb  

16:00 Tuan Billings LVN is Primary Nurse.                                                     em  

16:03 Huey Gonzalez PA is PHCP.                                                                cp  

16:03 Junito Garcia MD is Attending Physician.                                              cp  

16:24 Patient has correct armband on for positive identification. Placed in gown. Bed in low  em  

      position. Call light in reach. Adult w/ patient.                                            

17:00 Initial lab(s) drawn, by me, sent to lab. Inserted saline lock: 20 gauge in right       em  

      antecubital area, using aseptic technique. Blood collected.                                 

17:00 First set of blood cultures drawn by me, Urine collected: clean catch specimen, clear.  em  

17:06 US Extremity Venous Unilateral Ltd In Process Unspecified.                              EDMS

19:12 No provider procedures requiring assistance completed.                                  jd3 

19:28 IV discontinued, intact, bleeding controlled, No redness/swelling at site. Pressure     jd3 

      dressing applied.                                                                           

                                                                                                  

Administered Medications:                                                                         

18:15 Drug: NS 0.9% 500 ml Route: IV; Rate: bolus; Site: right antecubital;                   em  

19:14 Follow up: IV Status: Completed infusion; IV Intake: 500ml                              em  

18:16 Drug: Rocephin - (cefTRIAXone) 1 grams Route: IVPB; Infused Over: 30 mins; Site: right  iw  

      antecubital;                                                                                

18:45 Follow up: Response: No adverse reaction; IV Status: Completed infusion; IV Intake: 10mlem  

                                                                                                  

                                                                                                  

Intake:                                                                                           

18:45 IV: 10ml; Total: 10ml.                                                                  em  

19:14 IV: 500ml; Total: 510ml.                                                                em  

                                                                                                  

Outcome:                                                                                          

19:11 Discharge ordered by MD.                                                                cp  

19:27 Discharged to home ambulatory, with family.                                             jd3 

19:27 Condition: stable                                                                           

19:27 Discharge instructions given to patient, family, Instructed on discharge instructions,      

      follow up and referral plans. medication usage, Demonstrated understanding of               

      instructions, follow-up care, medications, Prescriptions given X 2.                         

19:29 Patient left the ED.                                                                    jd3 

                                                                                                  

Signatures:                                                                                       

Dispatcher [employer]                           EDMS                                                 

Amelie Craven, Tuan, LVN                       LVN  em                                                   

Cara Rabago, MIKHALI ELIZONDO   iw                                                   

Huye Gonzalez PA                         PA   cp                                                   

Varsha Sr RN RN hb Davies, Jonathon, RN                    RN   jd3                                                  

                                                                                                  

Corrections: (The following items were deleted from the chart)                                    

15:48 15:45 Presenting complaint: LLE pain and swelling x 2 days. hb                          hb  

                                                                                                  

**************************************************************************************************

## 2018-12-07 NOTE — RAD REPORT
EXAM DESCRIPTION:  US - Extremity Venous Uni Ltd - 12/7/2018 4:57 pm

 

CLINICAL HISTORY:  Left leg pain and swelling

 

COMPARISON:  None.

 

TECHNIQUE:  Real-time sonographic evaluation of the left lower extremity deep venous system was perfo
rmed.

 

FINDINGS:  Normal compressibility, flow augmentation, phasic flow and spontaneous flow are identified
 in the left lower extremity common femoral, superficial femoral, popliteal and posterior tibial vein
s. No intraluminal filling defects seen.

 

IMPRESSION:  No DVT in the left lower extremity.

## 2018-12-07 NOTE — EDPHYS
Physician Documentation                                                                           

 [facility]                                                                

Name: Selina Arenas                                                                               

Age: 68 yrs                                                                                       

Sex: Female                                                                                       

: 1950                                                                                   

MRN: [id number]                                                                                   

Arrival Date: 2018                                                                          

Time: 15:37                                                                                       

Account#: [id number]                                                                            

Bed 18                                                                                            

Private MD: out of town, doctor                                                                   

ED Physician Junito Garcia                                                                       

HPI:                                                                                              

                                                                                             

16:20 This 68 yrs old  Female presents to ER via Ambulatory with complaints of Leg   cp  

      Swelling.                                                                                   

16:20 The patient presents with swelling, tenderness, erythema. The complaints affect the     cp  

      left lower leg.                                                                             

16:20 Onset: The symptoms/episode began/occurred 5 day(s) ago.                                cp  

16:20 Associated signs and symptoms: Pertinent positives: swelling, warmth, erythema,         cp  

      Pertinent negatives fever, numbness. Treatment prior to arrival includes: no previous       

      treatment. The patient has experienced a previous episode, 2018, was diagnosed      

      with cellulitis of leg after DVT was r/o.                                                   

                                                                                                  

Historical:                                                                                       

- Allergies:                                                                                      

15:48 Ciprofloxacin;                                                                          hb  

- PMHx:                                                                                           

15:48 Anxiety; Diabetes - NIDDM; Psoriasis;                                                   hb  

- PSHx:                                                                                           

15:48 ; Tonsillectomy;                                                               hb  

                                                                                                  

- Immunization history:: Adult Immunizations up to date.                                          

- Social history:: Smoking status: Patient/guardian denies using tobacco.                         

- Ebola Screening: : No symptoms or risks identified at this time.                                

                                                                                                  

                                                                                                  

ROS:                                                                                              

16:27 Constitutional: Negative for body aches, chills, fever, poor PO intake.                 cp  

16:27 Eyes: Negative for injury, pain, redness, and discharge.                                cp  

16:27 ENT: Negative for drainage from ear(s), ear pain, sore throat, difficulty swallowing,       

      difficulty handling secretions.                                                             

16:27 Cardiovascular: Positive for edema, Negative for chest pain, palpitations.                  

16:27 Respiratory: Negative for cough, shortness of breath, wheezing.                             

16:27 Abdomen/GI: Negative for abdominal pain, nausea, vomiting, and diarrhea, constipation.      

16:27 Skin: Positive for erythema, swelling, of the left lower leg and left foot, Negative        

      for diaphoresis.                                                                            

16:27 Neuro: Negative for altered mental status, headache, syncope, weakness.                     

16:27 All other systems are negative.                                                             

                                                                                                  

Exam:                                                                                             

16:35 Constitutional: The patient appears in no acute distress, alert, awake,                 cp  

      non-diaphoretic, non-toxic, well developed, well nourished, obese.                          

16:35 Head/Face:  Normocephalic, atraumatic.                                                  cp  

16:35 Eyes: Periorbital structures: appear normal, Conjunctiva: normal, no exudate, no            

      injection, Sclera: no appreciated abnormality, Lids and lashes: appear normal,              

      bilaterally.                                                                                

16:35 ENT: External ear(s): are unremarkable, Nose: is normal, Mouth: Lips: moist, Oral           

      mucosa: moist, Posterior pharynx: is normal, airway is patent, no erythema, no exudate.     

16:35 Neck: ROM/movement: is normal, is supple, without pain, no range of motions                 

      limitations, no nuchal rigidity.                                                            

16:35 Chest/axilla: Inspection: normal, Palpation: is normal, no crepitus, no tenderness.         

16:35 Cardiovascular: Rate: tachycardic, Rhythm: regular.                                         

16:35 Respiratory: the patient does not display signs of respiratory distress,  Respirations:     

      normal, no use of accessory muscles, no retractions, no splinting, no tachypnea,            

      labored breathing, is not present, Breath sounds: are clear throughout, no decreased        

      breath sounds, no stridor, no wheezing.                                                     

16:35 Abdomen/GI: Exam negative for discomfort, distension, guarding, Inspection: abdomen         

      appears normal.                                                                             

16:35 Musculoskeletal/extremity: Extremities: grossly normal except: noted in the left lower      

      leg and left foot: erythema, swelling, tenderness, noted multiple superficial wounds        

      with mild drainage, Perfusion: the extremity is normally perfused throughout, Sensation     

      intact.                                                                                     

16:35 Skin: consistent with  psoriasis, on the right leg and left leg.                            

16:35 Neuro: Orientation: to person, place \T\ time. Mentation: is normal.                        

                                                                                                  

Vital Signs:                                                                                      

15:45  / 105; Pulse 106; Resp 20; Temp 98.0(TE); Pulse Ox 95% on R/A; Pain 3/10;        hb  

16:30  / 82; Pulse 111; Resp 19; Pulse Ox 96% on R/A;                                   em  

17:30  / 78; Pulse 122; Resp 18; Temp 99.5(O); Pulse Ox 100% on R/A; Pain 0/10;         em  

19:11 Pulse 107; Resp 17 S; Pulse Ox 100% on R/A;                                             jd3 

                                                                                                  

MDM:                                                                                              

16:03 Patient medically screened.                                                             cp  

16:30 Differential diagnosis: sepsis, cellulitis, abscess, DVT.                               cp  

19:10 Data reviewed: vital signs, nurses notes, lab test result(s), radiologic studies,       cp  

      ultrasound.                                                                                 

19:10 Counseling: I had a detailed discussion with the patient and/or guardian regarding: the cp  

      historical points, exam findings, and any diagnostic results supporting the                 

      discharge/admit diagnosis, lab results, radiology results, the need for outpatient          

      follow up, a family practitioner, to return to the emergency department if symptoms         

      worsen or persist or if there are any questions or concerns that arise at home. ED          

      course: VSS. US negative for DVT. Will discharge to home for continued monitoring.          

                                                                                                  

                                                                                             

16:14 Order name: CBC with Diff                                                                 

                                                                                             

16:14 Order name: BMP; Complete Time: 17:48                                                     

                                                                                             

17:48 Interpretation: Normal except: GLUC 156; BUN 19; GFR 62.                                  

                                                                                             

16:14 Order name: Wound Culture                                                                 

                                                                                             

16:14 Order name: Urine Microscopic Only; Complete Time: 17:48                                  

                                                                                             

17:48 Interpretation: Normal except: UWBC 10-20; URBC 5-10; UBACT >50; SQEPI >50.               

                                                                                             

16:14 Order name: Procalcitonin; Complete Time: 18:03                                           

                                                                                             

18:03 Interpretation: Reviewed.                                                                 

                                                                                             

16:14 Order name: Blood Culture Adult (2)                                                       

                                                                                             

16:14 Order name: US Extremity Venous Unilateral Ltd; Complete Time: 17:48                      

                                                                                             

17:48 Interpretation: Report reviewed.                                                          

                                                                                             

16:14 Order name: Urine Dipstick-Ancillary (obtain specimen); Complete Time: 17:14              

                                                                                             

16:21 Order name: CBC with Automated Diff; Complete Time: 17:48                               EDMS

                                                                                             

17:25 Order name: Urine Dipstick--Ancillary (enter results)                                   sg  

                                                                                                  

Administered Medications:                                                                         

18:15 Drug: NS 0.9% 500 ml Route: IV; Rate: bolus; Site: right antecubital;                   em  

19:14 Follow up: IV Status: Completed infusion; IV Intake: 500ml                              em  

18:16 Drug: Rocephin - (cefTRIAXone) 1 grams Route: IVPB; Infused Over: 30 mins; Site: right  iw  

      antecubital;                                                                                

18:45 Follow up: Response: No adverse reaction; IV Status: Completed infusion; IV Intake: 10mlem  

                                                                                                  

                                                                                                  

Disposition:                                                                                      

18 19:11 Discharged to Home. Impression: Cellulitis and acute lymphangitis of other         

  parts of limb - Left lower leg and foot.                                                        

- Condition is Stable.                                                                            

- Discharge Instructions: Cellulitis, Adult.                                                      

- Prescriptions for Doxycycline Hyclate 100 mg Oral Tablet - take 1 tablet by ORAL                

  route every 12 hours; 20 tablet. Bactrim - 160 mg Oral Tablet - take 1 tablet             

  by ORAL route every 12 hours for 10 days; 20 tablet.                                            

- Medication Reconciliation Form, Thank You Letter, Antibiotic Education, Prescription            

  Opioid Use form.                                                                                

- Follow up: Private Physician; When: 2 - 3 days; Reason: Wound Recheck.                          

- Problem is new.                                                                                 

- Symptoms have improved.                                                                         

                                                                                                  

                                                                                                  

                                                                                                  

Addendum:                                                                                         

2018                                                                                        

     22:59 Co-signature as Attending Physician, Junito Garcia MD I agree with the assessment and   k
dr

           plan of care.                                                                          

                                                                                                  

Signatures:                                                                                       

Dispatcher [employer]                           EDJunito Cueto MD MD   [facility]                                                  

Tuan Billings, LVN                       LVN  em                                                   

Cara Rabago RN RN   iw                                                   

Huey Gonzalez PA PA   cp                                                   

Varsha Sr RN                     RN   hb                                                   

Benson Gomes RN                    RN   jd3                                                  

                                                                                                  

Corrections: (The following items were deleted from the chart)                                    

                                                                                             

19:12 19:11 2018 19:11 Discharged to Home. Impression: Cellulitis and acute             cp  

      lymphangitis of other parts of limb. Condition is Stable. Forms are Medication              

      Reconciliation Form, Thank You Letter, Antibiotic Education, Prescription Opioid Use.       

      Follow up: Private Physician; When: 2 - 3 days; Reason: Wound Recheck. Problem is new.      

      Symptoms have improved. cp                                                                  

19:29 19:12 2018 19:11 Discharged to Home. Impression: Cellulitis and acute             jd3 

      lymphangitis of other parts of limb - Left lower leg and foot. Condition is Stable.         

      Forms are Medication Reconciliation Form, Thank You Letter, Antibiotic Education,           

      Prescription Opioid Use. Follow up: Private Physician; When: 2 - 3 days; Reason: Wound      

      Recheck. Problem is new. Symptoms have improved. cp                                         

                                                                                                  

**************************************************************************************************

## 2019-01-30 ENCOUNTER — HOSPITAL ENCOUNTER (INPATIENT)
Dept: HOSPITAL 97 - ER | Age: 69
LOS: 3 days | Discharge: HOME | DRG: 593 | End: 2019-02-02
Attending: FAMILY MEDICINE | Admitting: HOSPITALIST
Payer: COMMERCIAL

## 2019-01-30 VITALS — BODY MASS INDEX: 43.6 KG/M2

## 2019-01-30 DIAGNOSIS — F17.210: ICD-10-CM

## 2019-01-30 DIAGNOSIS — M48.00: ICD-10-CM

## 2019-01-30 DIAGNOSIS — G62.89: ICD-10-CM

## 2019-01-30 DIAGNOSIS — I87.8: ICD-10-CM

## 2019-01-30 DIAGNOSIS — F41.9: ICD-10-CM

## 2019-01-30 DIAGNOSIS — L40.9: ICD-10-CM

## 2019-01-30 DIAGNOSIS — Z79.84: ICD-10-CM

## 2019-01-30 DIAGNOSIS — E66.9: ICD-10-CM

## 2019-01-30 DIAGNOSIS — L97.411: Primary | ICD-10-CM

## 2019-01-30 DIAGNOSIS — E11.9: ICD-10-CM

## 2019-01-30 LAB
ALBUMIN SERPL BCP-MCNC: 2.9 G/DL (ref 3.4–5)
ALP SERPL-CCNC: 88 U/L (ref 45–117)
ALT SERPL W P-5'-P-CCNC: 21 U/L (ref 12–78)
AST SERPL W P-5'-P-CCNC: 21 U/L (ref 15–37)
BLD SMEAR INTERP: (no result)
BUN BLD-MCNC: 15 MG/DL (ref 7–18)
GLUCOSE SERPLBLD-MCNC: 146 MG/DL (ref 74–106)
HCT VFR BLD CALC: 35.3 % (ref 36–45)
LYMPHOCYTES # SPEC AUTO: 1.5 K/UL (ref 0.7–4.9)
MORPHOLOGY BLD-IMP: (no result)
PMV BLD: 7.5 FL (ref 7.6–11.3)
POTASSIUM SERPL-SCNC: 3.9 MMOL/L (ref 3.5–5.1)
RBC # BLD: 3.65 M/UL (ref 3.86–4.86)

## 2019-01-30 PROCEDURE — 93005 ELECTROCARDIOGRAM TRACING: CPT

## 2019-01-30 PROCEDURE — 80202 ASSAY OF VANCOMYCIN: CPT

## 2019-01-30 PROCEDURE — 81015 MICROSCOPIC EXAM OF URINE: CPT

## 2019-01-30 PROCEDURE — 81003 URINALYSIS AUTO W/O SCOPE: CPT

## 2019-01-30 PROCEDURE — 85025 COMPLETE CBC W/AUTO DIFF WBC: CPT

## 2019-01-30 PROCEDURE — 36415 COLL VENOUS BLD VENIPUNCTURE: CPT

## 2019-01-30 PROCEDURE — 80076 HEPATIC FUNCTION PANEL: CPT

## 2019-01-30 PROCEDURE — 82962 GLUCOSE BLOOD TEST: CPT

## 2019-01-30 PROCEDURE — 87040 BLOOD CULTURE FOR BACTERIA: CPT

## 2019-01-30 PROCEDURE — 84145 PROCALCITONIN (PCT): CPT

## 2019-01-30 PROCEDURE — 87086 URINE CULTURE/COLONY COUNT: CPT

## 2019-01-30 PROCEDURE — 85610 PROTHROMBIN TIME: CPT

## 2019-01-30 PROCEDURE — 80048 BASIC METABOLIC PNL TOTAL CA: CPT

## 2019-01-30 PROCEDURE — 93306 TTE W/DOPPLER COMPLETE: CPT

## 2019-01-30 PROCEDURE — 99285 EMERGENCY DEPT VISIT HI MDM: CPT

## 2019-01-30 PROCEDURE — 85730 THROMBOPLASTIN TIME PARTIAL: CPT

## 2019-01-30 PROCEDURE — 96365 THER/PROPH/DIAG IV INF INIT: CPT

## 2019-01-30 PROCEDURE — 87075 CULTR BACTERIA EXCEPT BLOOD: CPT

## 2019-01-30 PROCEDURE — 87205 SMEAR GRAM STAIN: CPT

## 2019-01-30 PROCEDURE — 87070 CULTURE OTHR SPECIMN AEROBIC: CPT

## 2019-01-30 PROCEDURE — 87088 URINE BACTERIA CULTURE: CPT

## 2019-01-30 RX ADMIN — SODIUM CHLORIDE SCH MLS: 0.9 INJECTION, SOLUTION INTRAVENOUS at 22:44

## 2019-01-30 RX ADMIN — HYDROMORPHONE HYDROCHLORIDE PRN MG: 1 INJECTION, SOLUTION INTRAMUSCULAR; INTRAVENOUS; SUBCUTANEOUS at 22:44

## 2019-01-30 RX ADMIN — Medication SCH ML: at 21:00

## 2019-01-30 NOTE — ER
Nurse's Notes                                                                                     

 [facility]                                                                

Name: Selina Arenas                                                                               

Age: 68 yrs                                                                                       

Sex: Female                                                                                       

: 1950                                                                                   

MRN: [id number]                                                                                   

Arrival Date: 2019                                                                          

Time: 18:09                                                                                       

Account#: [id number]                                                                            

Bed 13                                                                                            

Private MD:                                                                                       

Diagnosis: Open wound of foot;Diabetes mellitus due to underlying condition with foot ulcer       

                                                                                                  

Presentation:                                                                                     

                                                                                             

18:15 Presenting complaint: Patient states: right heel blister has increased in size for      sv  

      about a week. Transition of care: patient was not received from another setting of          

      care. Onset of symptoms was 2019. Care prior to arrival: None.                  

18:15 Method Of Arrival: Ambulatory                                                           sv  

18:15 Acuity: EDITH 3                                                                           sv  

21:20 Risk Assessment: Do you want to hurt yourself or someone else? Patient reports no       aj  

      desire to harm self or others. Initial Sepsis Screen: Does the patient meet any 2           

      criteria? No. Patient's initial sepsis screen is negative. Does the patient have a          

      suspected source of infection? No. Patient's initial sepsis screen is negative.             

                                                                                                  

Triage Assessment:                                                                                

18:18 General: Appears in no apparent distress. uncomfortable, Behavior is calm, cooperative, sv  

      appropriate for age. Pain: Complains of pain in right heel. Neuro: Level of                 

      Consciousness is awake, alert, obeys commands, Oriented to person, place, time,             

      situation, Gait is steady. Respiratory: Respiratory effort is even, unlabored,              

      Respiratory pattern is regular, symmetrical.                                                

                                                                                                  

Historical:                                                                                       

- Allergies:                                                                                      

18:16 Ciprofloxacin;                                                                          sv  

- PMHx:                                                                                           

18:16 Anxiety; Diabetes - NIDDM; psoriasis;                                                   sv  

- PSHx:                                                                                           

18:16 ; Tonsillectomy;                                                               sv  

                                                                                                  

- Immunization history:: Adult Immunizations up to date.                                          

- Social history:: Smoking status: Patient/guardian denies using tobacco.                         

- Ebola Screening: : Patient negative for fever greater than or equal to 101.5 degrees            

  Fahrenheit, and additional compatible Ebola Virus Disease symptoms Patient denies               

  exposure to infectious person Patient denies travel to an Ebola-affected area in the            

  21 days before illness onset No symptoms or risks identified at this time.                      

                                                                                                  

                                                                                                  

Screenin:30 Abuse screen: Denies threats or abuse. Denies injuries from another. Nutritional        aj  

      screening: No deficits noted. Tuberculosis screening: No symptoms or risk factors           

      identified. Fall Risk None identified.                                                      

                                                                                                  

Assessment:                                                                                       

17:30 General: Appears in no apparent distress. comfortable, obese, Behavior is calm,         aj  

      cooperative, appropriate for age. Pain: Denies pain. Neuro: Level of Consciousness is       

      awake, alert, obeys commands, Oriented to person, place, time, situation, Appropriate       

      for age. Respiratory: Airway is patent Respiratory effort is even, unlabored,               

      Respiratory pattern is regular, symmetrical. GI: No signs and/or symptoms were reported     

      involving the gastrointestinal system. Derm: Redness with dry scaly skin noted to right     

      lower leg.                                                                                  

                                                                                                  

Vital Signs:                                                                                      

18:16  / 67; Pulse 122; Resp 20; Temp 98.5; Pulse Ox 98% ; Height 5 ft. 10 in. (177.80  sv  

      cm); Pain 4/10;                                                                             

21:05  / 84; Pulse 119; Resp 20; Pulse Ox 99% on R/A;                                   aj  

                                                                                                  

ED Course:                                                                                        

17:30 Patient has correct armband on for positive identification.                             aj  

17:30 Inserted saline lock: 20 gauge in right antecubital area, using aseptic technique.      aj  

      Blood collected. By Fernando ED tech.                                                        

18:09 Patient arrived in ED.                                                                  as  

18:16 Triage completed.                                                                       sv  

18:18 Arm band placed on.                                                                     sv  

18:41 Rell Mathis PA is PHCP.                                                               [id number] 

18:41 Kolby Dubose MD is Attending Physician.                                           [id number] 

18:59 Mitzy Dean, RN is Primary Nurse.                                                     aj  

19:33 XRAY Foot RIGHT 3 View In Process Unspecified.                                          EDMS

20:00 Kolby Jeronimo MD is Hospitalizing Provider.                                           [id number] 

21:07 Attempted to give report to Terry on 4th floor. Was told that she was medicating a    aj  

      patient and would call me back.                                                             

21:20 No provider procedures requiring assistance completed.                                  aj  

21:25 Report given to Terry 4 th floor.                                                     aj  

21:25 Patient admitted, IV remains in place.                                                  aj  

                                                                                                  

Administered Medications:                                                                         

19:39 Drug: Zosyn 3.375 grams Route: IVPB; Infused Over: 60 mins; Site: right antecubital;    aj  

21:04 Follow up: Response: No adverse reaction; IV Status: Completed infusion; IV Intake:     aj  

      100ml                                                                                       

21:03 Drug: vancoMYCIN 1 grams Route: IVPB; Infused Over: 2 hrs; Site: right antecubital;     aj  

21:27 Follow up: Response: No adverse reaction; IV Status: Infusion continued upon transfer;  aj  

      IV Intake: 50ml                                                                             

                                                                                                  

                                                                                                  

Intake:                                                                                           

21:04 IV: 100ml; Total: 100ml.                                                                aj  

21:27 IV: 50ml; Total: 150ml.                                                                 endy  

                                                                                                  

Outcome:                                                                                          

20:01 Decision to Hospitalize by Provider.                                                    pam 

21:25 Admitted to Med/surg accompanied by tech, family with patient, via wheelchair, with     aj  

      chart, Report called to  Terry                                                            

21:25 Condition: good                                                                             

21:25 Instructed on the need for admit.                                                           

21:41 Patient left the ED.                                                                    fc  

                                                                                                  

Signatures:                                                                                       

Dispatcher [employer]                           Alecia Weems RN RN sv Myers, Amanda, RN RN aj Chretien, Felicia, RN RN fc Martinez, Amelia as Roszak, Josh, PA PA   [id number]                                                  

                                                                                                  

Corrections: (The following items were deleted from the chart)                                    

18:19 18:16 Pulse 122bpm; Resp 20bpm; Pulse Ox 98%; Temp 98.5F; Height 5 ft. 10 in.; Pain     sv  

      4/10; sv                                                                                    

                                                                                                  

**************************************************************************************************

## 2019-01-30 NOTE — RAD REPORT
EXAM DESCRIPTION:  RAD - Foot Right 3 View - 1/30/2019 7:32 pm

 

CLINICAL HISTORY:   Right foot pain

 

FINDINGS:  No fracture or dislocation is seen. The bones are osteoporotic. Large calcaneal spurs are 
noted. No destructive bony lesion seen

## 2019-01-30 NOTE — EDPHYS
Physician Documentation                                                                           

 [facility]                                                                

Name: Selina Arenas                                                                               

Age: 68 yrs                                                                                       

Sex: Female                                                                                       

: 1950                                                                                   

MRN: [id number]                                                                                   

Arrival Date: 2019                                                                          

Time: 18:09                                                                                       

Account#: [id number]                                                                            

Bed 13                                                                                            

Private MD:                                                                                       

ED Physician Kolby Dubose                                                                    

HPI:                                                                                              

                                                                                             

19:19 This 68 yrs old  Female presents to ER via Ambulatory with complaints of Skin  jr8 

      Sore(s).                                                                                    

19:19 The patient presents with pain, that is acute. The complaints affect the right foot.    jr8 

      Onset: The symptoms/episode began/occurred at an unknown time. Modifying factors: The       

      symptoms are alleviated by nothing, the symptoms are aggravated by weight bearing,          

      movement, wearing shoes. Associated signs and symptoms: The patient has no apparent         

      associated signs or symptoms. Severity of symptoms: At their worst the symptoms were        

      moderate, in the emergency department the symptoms are unchanged. The patient has not       

      experienced similar symptoms in the past. The patient has not recently seen a               

      physician. Patient stated that she recently felt right heel pain. Had  look at       

      it yesterday and saw blister. Patient looked at it today and noticed that it was very       

      large. History of psoriasis and diabetes.                                                   

                                                                                                  

Historical:                                                                                       

- Allergies:                                                                                      

18:16 Ciprofloxacin;                                                                          sv  

- PMHx:                                                                                           

18:16 Anxiety; Diabetes - NIDDM; psoriasis;                                                   sv  

- PSHx:                                                                                           

18:16 ; Tonsillectomy;                                                               sv  

                                                                                                  

- Immunization history:: Adult Immunizations up to date.                                          

- Social history:: Smoking status: Patient/guardian denies using tobacco.                         

- Ebola Screening: : Patient negative for fever greater than or equal to 101.5 degrees            

  Fahrenheit, and additional compatible Ebola Virus Disease symptoms Patient denies               

  exposure to infectious person Patient denies travel to an Ebola-affected area in the            

  21 days before illness onset No symptoms or risks identified at this time.                      

                                                                                                  

                                                                                                  

ROS:                                                                                              

19:19 Eyes: Negative for injury, pain, redness, and discharge, ENT: Negative for injury,      jr8 

      pain, and discharge, Neck: Negative for injury, pain, and swelling, Cardiovascular:         

      Negative for chest pain, palpitations, and edema, Respiratory: Negative for shortness       

      of breath, cough, wheezing, and pleuritic chest pain, Abdomen/GI: Negative for              

      abdominal pain, nausea, vomiting, diarrhea, and constipation, Back: Negative for injury     

      and pain, Skin: Negative for injury, rash, and discoloration, Neuro: Negative for           

      headache, weakness, numbness, tingling, and seizure.                                        

19:19 MS/extremity: Positive for pain, ulceration to right foot.                                  

                                                                                                  

Exam:                                                                                             

19:19 Eyes:  Pupils equal round and reactive to light, extra-ocular motions intact.  Lids and jr8 

      lashes normal.  Conjunctiva and sclera are non-icteric and not injected.  Cornea within     

      normal limits.  Periorbital areas with no swelling, redness, or edema. ENT:  Nares          

      patent. No nasal discharge, no septal abnormalities noted.  Tympanic membranes are          

      normal and external auditory canals are clear.  Oropharynx with no redness, swelling,       

      or masses, exudates, or evidence of obstruction, uvula midline.  Mucous membranes           

      moist. Neck:  Trachea midline, no thyromegaly or masses palpated, and no cervical           

      lymphadenopathy.  Supple, full range of motion without nuchal rigidity, or vertebral        

      point tenderness.  No Meningismus. Cardiovascular:  Regular rate and rhythm with a          

      normal S1 and S2.  No gallops, murmurs, or rubs.  Normal PMI, no JVD.  No pulse             

      deficits. Respiratory:  Lungs have equal breath sounds bilaterally, clear to                

      auscultation and percussion.  No rales, rhonchi or wheezes noted.  No increased work of     

      breathing, no retractions or nasal flaring. Abdomen/GI:  Soft, non-tender, with normal      

      bowel sounds.  No distension or tympany.  No guarding or rebound.  No evidence of           

      tenderness throughout. Back:  No spinal tenderness.  No costovertebral tenderness.          

      Full range of motion. MS/ Extremity:  Pulses equal, no cyanosis.  Neurovascular intact.     

       Full, normal range of motion. 2+ pitting edema bilaterally  Neuro:  Awake and alert,       

      GCS 15, oriented to person, place, time, and situation.  Cranial nerves II-XII grossly      

      intact.  Motor strength 5/5 in all extremities.  Sensory grossly intact.  Cerebellar        

      exam normal.  Normal gait.                                                                  

19:19 Skin: Patient has half dollar size black necrotic wound to right heel with erythema         

      surrounding it. Warm to touch. No streaking noted. Mild erythema in general to              

      bilateral lower feet and legs with plaquing from psoriasis.                                 

                                                                                                  

Vital Signs:                                                                                      

18:16  / 67; Pulse 122; Resp 20; Temp 98.5; Pulse Ox 98% ; Height 5 ft. 10 in. (177.80  sv  

      cm); Pain 4/10;                                                                             

21:05  / 84; Pulse 119; Resp 20; Pulse Ox 99% on R/A;                                   aj  

                                                                                                  

MDM:                                                                                              

18:41 Patient medically screened.                                                             [facility] [[email or web address]] 

19:59 Data reviewed: vital signs, nurses notes, lab test result(s), radiologic studies, plain [facility] [[email or web address]] 

      films. Data interpreted: Pulse oximetry: on room air is 98 %. Interpretation: normal.       

      Counseling: I had a detailed discussion with the patient and/or guardian regarding: the     

      historical points, exam findings, and any diagnostic results supporting the                 

      discharge/admit diagnosis, lab results, radiology results, the need for further work-up     

      and treatment in the hospital.                                                              

                                                                                                  

                                                                                             

18:55 Order name: Blood Culture Adult (2)                                                     [facility] [[email or web address]] 

                                                                                             

18:55 Order name: Procalcitonin; Complete Time: 20:26                                         [facility] [[email or web address]] 

                                                                                             

18:55 Order name: CBC with Diff; Complete Time: 20:44                                         [facility] [[email or web address]] 

                                                                                             

18:55 Order name: Basic Metabolic Panel; Complete Time: 19:58                                 [facility] [[email or web address]] 

                                                                                             

18:55 Order name: LFT's; Complete Time: 19:58                                                 8 

                                                                                             

19:47 Order name: CBC Smear Scan; Complete Time: 20:44                                        EDMS

                                                                                             

20:27 Order name: Basic Metabolic Panel                                                       EDMS

                                                                                             

20:27 Order name: Basic Metabolic Panel                                                       EDMS

                                                                                             

20:27 Order name: CBC with Automated Diff                                                     EDMS

                                                                                             

20:27 Order name: CBC with Automated Diff                                                     EDMS

                                                                                             

20:27 Order name: Protime (+INR)                                                              EDMS

                                                                                             

20:27 Order name: Protime (+INR)                                                              EDMS

                                                                                             

20:27 Order name: PTT, Activated Partial Thromb                                               EDMS

                                                                                             

20:27 Order name: PTT, Activated Partial Thromb                                               EDMS

                                                                                             

18:55 Order name: XRAY Foot RIGHT 3 View; Complete Time: 19:58                                8 

                                                                                             

18:55 Order name: IV; Complete Time: 19:34                                                    jr8 

                                                                                             

20:26 Order name: CONS Pharmacy Consult                                                       EDMS

                                                                                             

20:26 Order name: CONS Physician Consult                                                      EDMS

                                                                                             

20:26 Order name: NPO                                                                         EDMS

                                                                                                  

Administered Medications:                                                                         

19:39 Drug: Zosyn 3.375 grams Route: IVPB; Infused Over: 60 mins; Site: right antecubital;      

21:04 Follow up: Response: No adverse reaction; IV Status: Completed infusion; IV Intake:     aj  

      100ml                                                                                       

21:03 Drug: vancoMYCIN 1 grams Route: IVPB; Infused Over: 2 hrs; Site: right antecubital;     aj  

21:27 Follow up: Response: No adverse reaction; IV Status: Infusion continued upon transfer;    

      IV Intake: 50ml                                                                             

                                                                                                  

                                                                                                  

Disposition:                                                                                      

19 20:01 Hospitalization ordered by Kolby Jeronimo for Inpatient Admission. Preliminary     

  diagnosis are Open wound of foot, Diabetes mellitus due to underlying                           

  condition with foot ulcer.                                                                      

- Bed requested for Telemetry/MedSurg (Inpatient).                                                

- Status is Inpatient Admission.                                                              fc  

- Condition is Stable.                                                                            

- Problem is new.                                                                                 

- Symptoms are unchanged.                                                                         

UTI on Admission? No                                                                              

                                                                                                  

                                                                                                  

                                                                                                  

Addendum:                                                                                         

2019                                                                                        

     10:25 Co-signature as Attending Physician, Kolby Dubose MD.                               m
a2

                                                                                                  

Signatures:                                                                                       

Dispatcher [employer]                           EDAlecia Ugalde RN RN sv Myers, Amanda, RN RN aj Chretien, Felicia, RN RN                                                      

Rell Mathis PA PA   jr8                                                  

Ketty Montes RN RN cg Alzahri, Mohammad, MD MD   ma2                                                  

                                                                                                  

Corrections: (The following items were deleted from the chart)                                    

                                                                                             

20:45 20:01 Hospitalization Ordered by Kolby Jeronimo MD for Inpatient Admission. Preliminary  cg  

      diagnosis is Open wound of foot; Diabetes mellitus due to underlying condition with         

      foot ulcer. Bed requested for Telemetry/MedSurg (Inpatient). Status is Inpatient            

      Admission. Condition is Stable. Problem is new. Symptoms are unchanged. UTI on              

      Admission? No. jr8                                                                          

21:41 20:45 2019 20:01 Hospitalization Ordered by Kolby Jeronimo MD for Inpatient        fc  

      Admission. Preliminary diagnosis is Open wound of foot; Diabetes mellitus due to            

      underlying condition with foot ulcer. Bed requested for Telemetry/MedSurg (Inpatient).      

      Status is Inpatient Admission. Condition is Stable. Problem is new. Symptoms are            

      unchanged. UTI on Admission? No. cg                                                         

                                                                                                  

**************************************************************************************************

## 2019-01-31 LAB
BUN BLD-MCNC: 18 MG/DL (ref 7–18)
GLUCOSE SERPLBLD-MCNC: 162 MG/DL (ref 74–106)
HCT VFR BLD CALC: 35 % (ref 36–45)
INR BLD: 1.2
LYMPHOCYTES # SPEC AUTO: 1.1 K/UL (ref 0.7–4.9)
PMV BLD: 7.7 FL (ref 7.6–11.3)
POTASSIUM SERPL-SCNC: 3.7 MMOL/L (ref 3.5–5.1)
RBC # BLD: 3.64 M/UL (ref 3.86–4.86)
UA COMPLETE W REFLEX CULTURE PNL UR: (no result)
UA DIPSTICK W REFLEX MICRO PNL UR: (no result)

## 2019-01-31 RX ADMIN — Medication SCH: at 09:00

## 2019-01-31 RX ADMIN — TRAMADOL HYDROCHLORIDE PRN MG: 50 TABLET, COATED ORAL at 20:20

## 2019-01-31 RX ADMIN — SODIUM CHLORIDE SCH MLS: 0.9 INJECTION, SOLUTION INTRAVENOUS at 23:40

## 2019-01-31 RX ADMIN — SODIUM CHLORIDE SCH: 0.9 INJECTION, SOLUTION INTRAVENOUS at 10:20

## 2019-01-31 RX ADMIN — SODIUM CHLORIDE SCH MLS: 9 INJECTION, SOLUTION INTRAVENOUS at 00:00

## 2019-01-31 RX ADMIN — SODIUM CHLORIDE SCH MLS: 9 INJECTION, SOLUTION INTRAVENOUS at 13:09

## 2019-01-31 RX ADMIN — Medication SCH: at 21:00

## 2019-01-31 RX ADMIN — SODIUM CHLORIDE SCH MLS: 9 INJECTION, SOLUTION INTRAVENOUS at 23:33

## 2019-01-31 RX ADMIN — SODIUM CHLORIDE SCH MLS: 9 INJECTION, SOLUTION INTRAVENOUS at 17:24

## 2019-01-31 RX ADMIN — HYDROMORPHONE HYDROCHLORIDE PRN MG: 1 INJECTION, SOLUTION INTRAMUSCULAR; INTRAVENOUS; SUBCUTANEOUS at 09:10

## 2019-01-31 RX ADMIN — TRAMADOL HYDROCHLORIDE PRN MG: 50 TABLET, COATED ORAL at 14:18

## 2019-01-31 RX ADMIN — SODIUM CHLORIDE SCH: 9 INJECTION, SOLUTION INTRAVENOUS at 06:00

## 2019-01-31 RX ADMIN — HYDROMORPHONE HYDROCHLORIDE PRN MG: 1 INJECTION, SOLUTION INTRAMUSCULAR; INTRAVENOUS; SUBCUTANEOUS at 03:38

## 2019-01-31 RX ADMIN — SODIUM CHLORIDE SCH MLS: 0.9 INJECTION, SOLUTION INTRAVENOUS at 16:32

## 2019-01-31 RX ADMIN — Medication SCH PKT: at 20:12

## 2019-01-31 NOTE — P.HP
Certification for Inpatient


Patient admitted to: Inpatient


With expected LOS: >2 Midnights


Patient will require the following post-hospital care: Home Health Services


Practitioner: I am a practitioner with admitting privileges, knowledge of 

patient current condition, hospital course, and medical plan of care.


Services: Services provided to patient in accordance with Admission 

requirements found in Title 42 Section 412.3 of the Code of Federal Regulations





Patient History


Date of Service: 01/30/19


Reason for admission:  necrotic heel ulcer-right foot


History of Present Illness: 





Patient is patient is a 68-year-old female who was brought to the hospital with 

a necrotic wound on her right heel.  Patient has a history of psoriasis as well 

as neuropathy secondary to spinal stenosis.  Patient also has type 2 diabetes.  

Patient has psoriatic lesions on her lower extremities.  She also has 

significant lower extremity edema.  She states she does not have a history of 

congestive heart failure.  She has had a ultrasound done 2 years ago with no 

cardiac abnormalities.  Patient will be admitted to the hospital for 

debridement of her necrotic wound on the right heel.


Allergies





ciprofloxacin Allergy (Verified 01/30/19 22:43)


 Unknown





Home Medications: 








Levothyroxine Sodium 25 mcg PO [id number] 01/31/19 


Tryptophan [l-Tryptophan] 1,000 mg PO BEDTIME 01/31/19 


Tryptophan [l-Tryptophan] 500 mg PO BEDTIME PRN 01/31/19 


clonazePAM [Clonazepam] 0.25 mg PO BEDTIME 01/31/19 


clonazePAM [Klonopin*] 0.5 mg PO BID 01/31/19 


hydrOXYzine pamoate [Hydroxyzine Pamoate] 1 - 2 cap PO SEECOM PRN 01/31/19 


predniSONE [Prednisone*] 5 mg PO TID 01/31/19 








- Past Medical/Surgical History


Has patient received pneumonia vaccine in the past: Yes


Diabetic: Yes


-: niddm


-: psorsasis


-: anxiety


-: tonsilectomy


-: c-sdection





- Family History


  ** Mother


History Unknown: Yes





  ** Father


History Unknown: Yes





- Social History


Smoking Status: Current every day smoker


Alcohol use: Yes


CD- Drugs: No


Caffeine use: Yes


Place of Residence: Home





Review of Systems


10-point ROS is otherwise unremarkable





Physical Examination





- Vital Signs


Temperature: 99.0 F


Blood Pressure: 110/84


Pulse: 114


Respirations: 24


Pulse Ox (%): 93





- Physical Exam


General: Alert, In no apparent distress, Oriented x3, Obese


HEENT: Atraumatic, Normocephalic


Neck: Supple, 2+ carotid pulse no bruit, JVD not distended


Respiratory: Clear to auscultation bilaterally, Normal air movement


Cardiovascular: No edema, Normal pulses, Regular rate/rhythm


Gastrointestinal: Normal bowel sounds, Soft and benign, Non-distended


Musculoskeletal: No clubbing, No contractures, Swelling, Erythema, Tenderness


Integumentary: Rash(es), Skin breakdown, Skin lesion, Tenderness/swelling, 

Erythema, Diabetic ulcer, Venous stasis ulcer


Neurological: Normal tone, Sensation intact, Cranial nerves 3-12 intact, Normal 

reflexes 2+, Abnormal gait


Lymphatics: No axilla or inguinal lymphadenopathy





- Studies


Laboratory Data (last 24 hrs)





01/30/19 19:20: Sodium 137, Potassium 3.9, BUN 15, Creatinine 0.86, Glucose 146 

H, Total Bilirubin 0.4, AST 21, ALT 21, Alkaline Phosphatase 88


01/30/19 19:20: WBC 9.6, Hgb 11.8 L, Hct 35.3 L, Plt Count 419 H








Assessment & Plan





- Problems (Diagnosis)


(1) Open wound of right heel


Current Visit: Yes   Status: Acute   





(2) Type 2 diabetes mellitus


Current Visit: Yes   Status: Acute   





(3) Spinal stenosis


Current Visit: Yes   Status: Acute   





(4) Neuropathy


Current Visit: Yes   Status: Acute   





- Plan





1. Continue with IV antibiotic


2. Continue with local wound care


3. Wound care consultation/surgical consultation


4. Gentle IV hydration


5. Monitor CBC


6. Strict blood sugar monitoring


7. Pain control


8. GI and DVT prophylaxis





Discharge Plan: Home


Plan to discharge in: Greater than 2 days





- Advance Directives


Does patient have a Living Will: No


Does patient have a Durable POA for Healthcare: No





- Code Status/Comfort Care


Code Status Assessed: Yes


Code Status: Full Code


Critical Care: No


Time Spent Managing PTS Care (In Minutes): 55

## 2019-01-31 NOTE — EKG
Test Date:    2019-01-31               Test Time:    07:51:02

Technician:   ASH                                     

                                                     

MEASUREMENT RESULTS:                                       

Intervals:                                           

Rate:         109                                    

TN:                                                  

QRSD:         94                                     

QT:           354                                    

QTc:          476                                    

Axis:                                                

P:                                                   

TN:                                                  

QRS:          73                                     

T:            73                                     

                                                     

INTERPRETIVE STATEMENTS:                                       

                                                     

Atrial fibrillation with rapid ventricular response

Incomplete right bundle branch block

Abnormal ECG

Compared to ECG 10/01/2018 19:11:08

Myocardial infarct finding no longer present



Electronically Signed On 01-31-19 12:19:53 CST by Barak Mooney

## 2019-01-31 NOTE — P.PN
Subjective


Date of Service: 01/31/19


Chief Complaint:  necrotic heel ulcer-right foot


Subjective: No C/O voiced





Patient seen and examined at bedside.  No family at bedside.  Chart reviewed 

and case discussed with nursing staff.





Review of Systems


10-point ROS is otherwise unremarkable





Physical Examination





- Vital Signs


Temperature: 99.5 F


Blood Pressure: 134/65


Pulse: 110


Respirations: 20


Pulse Ox (%): 92





- Physical Exam


General: Alert, In no apparent distress, Oriented x3


HEENT: Atraumatic, PERRLA, EOMI


Neck: Supple, JVD not distended


Respiratory: Clear to auscultation bilaterally, Normal air movement


Cardiovascular: Regular rate/rhythm, Normal S1 S2


Gastrointestinal: Normal bowel sounds, No tenderness


Musculoskeletal: No tenderness


Integumentary: Rash(es), Skin breakdown, Skin lesion, Diabetic ulcer


Neurological: Normal speech, Normal tone, Normal affect


Lymphatics: No axilla or inguinal lymphadenopathy





- Studies


Laboratory Data (last 24 hrs)





01/30/19 19:20: Sodium 137, Potassium 3.9, BUN 15, Creatinine 0.86, Glucose 146 

H, Total Bilirubin 0.4, AST 21, ALT 21, Alkaline Phosphatase 88


01/30/19 19:20: WBC 9.6, Hgb 11.8 L, Hct 35.3 L, Plt Count 419 H








Assessment And Plan





- Plan








(1) Open wound of right heel





(2) Type 2 diabetes mellitus





(3) Spinal stenosis





(4) Neuropathy





1. Continue with IV antibiotic


2. Continue with local wound care


3. Wound care consultation/surgical consultation; recommendations appreciated


4. Gentle IV hydration


5. Monitor CBC


6. Strict blood sugar monitoring


7. Pain control


8. GI and DVT prophylaxis

## 2019-01-31 NOTE — P.CNS
Chief Complaint:  necrotic heel ulcer-right foot


History of Present Illness: 





She was recently on a cruise.  When she returned, she had pain and discomfort 

on her heel.


Allergies





ciprofloxacin Allergy (Verified 01/30/19 22:43)


 Unknown





Home Medications: 








Levothyroxine Sodium 25 mcg PO [id number] 01/31/19 


Tryptophan [l-Tryptophan] 1,000 mg PO BEDTIME 01/31/19 


Tryptophan [l-Tryptophan] 500 mg PO BEDTIME PRN 01/31/19 


clonazePAM [Clonazepam] 0.25 mg PO BEDTIME 01/31/19 


clonazePAM [Klonopin*] 0.5 mg PO BID 01/31/19 


hydrOXYzine pamoate [Hydroxyzine Pamoate] 1 - 2 cap PO SEECOM PRN 01/31/19 


predniSONE [Prednisone*] 5 mg PO TID 01/31/19 








- Past Medical/Surgical History


Diabetic: Yes


-: niddm


-: psorsasis


-: anxiety


-: tonsilectomy


-: c-sdection





- Family History


  ** Mother


History Unknown: Yes





  ** Father


History Unknown: Yes





- Social History


Smoking Status: Current every day smoker


Alcohol use: Yes


CD- Drugs: No


Caffeine use: Yes


Place of Residence: Home





Physical Examination














Temp Pulse Resp BP Pulse Ox


 


 99.0 F   114 H  24 H  110/84   93 


 


 01/31/19 10:06  01/31/19 10:06  01/31/19 10:06  01/31/19 10:06  01/31/19 10:06








General: Alert


HEENT: Other (Normal)


Respiratory: Other (Chest movement equal bilaterally)


Musculoskeletal: Other (Patient is lay show bilateral edema. Also has sore IX 

changes on her lip both lower legs.  Her heel shows a ulceration consistent 

with possible blister that has opened.)


Laboratory Data (last 24 hrs)





01/30/19 19:20: Sodium 137, Potassium 3.9, BUN 15, Creatinine 0.86, Glucose 146 

H, Total Bilirubin 0.4, AST 21, ALT 21, Alkaline Phosphatase 88


01/30/19 19:20: WBC 9.6, Hgb 11.8 L, Hct 35.3 L, Plt Count 419 H





Conclusions/Impression: 





This patient presented with an open area on her heel walk, consistent with a 

possible blister questionable necrotic tissue. She is dressings on the moment.  

Has sore right plaque changes on her both lower legs.  Also has peripheral 

edema and findings consistent with venous stasis.





The patient has been started on IV antibiotics, she appears comfortable in the 

bed in her pain is well controlled.  She will be evaluated by the wound care 

team, and we await their recommendations.  She may require possible surgical 

debridement, or follow-up in the [facility]ound Care Center.

## 2019-02-01 VITALS — OXYGEN SATURATION: 93 %

## 2019-02-01 RX ADMIN — ALBUTEROL SULFATE SCH MG: 2.5 SOLUTION RESPIRATORY (INHALATION) at 07:40

## 2019-02-01 RX ADMIN — SODIUM CHLORIDE SCH MLS: 9 INJECTION, SOLUTION INTRAVENOUS at 17:00

## 2019-02-01 RX ADMIN — Medication SCH: at 08:39

## 2019-02-01 RX ADMIN — Medication SCH PKT: at 21:00

## 2019-02-01 RX ADMIN — SODIUM CHLORIDE SCH MLS: 9 INJECTION, SOLUTION INTRAVENOUS at 18:00

## 2019-02-01 RX ADMIN — Medication SCH: at 21:00

## 2019-02-01 RX ADMIN — ALBUTEROL SULFATE SCH: 2.5 SOLUTION RESPIRATORY (INHALATION) at 08:00

## 2019-02-01 RX ADMIN — Medication SCH APPL: at 08:40

## 2019-02-01 RX ADMIN — SODIUM CHLORIDE SCH: 0.9 INJECTION, SOLUTION INTRAVENOUS at 10:54

## 2019-02-01 RX ADMIN — SODIUM CHLORIDE SCH MLS: 9 INJECTION, SOLUTION INTRAVENOUS at 00:24

## 2019-02-01 RX ADMIN — TRAMADOL HYDROCHLORIDE PRN MG: 50 TABLET, COATED ORAL at 06:09

## 2019-02-01 RX ADMIN — SODIUM CHLORIDE SCH MLS: 9 INJECTION, SOLUTION INTRAVENOUS at 05:15

## 2019-02-01 RX ADMIN — SODIUM CHLORIDE SCH MLS: 9 INJECTION, SOLUTION INTRAVENOUS at 12:00

## 2019-02-01 RX ADMIN — ALBUTEROL SULFATE SCH MG: 2.5 SOLUTION RESPIRATORY (INHALATION) at 20:40

## 2019-02-01 RX ADMIN — LEVOTHYROXINE SODIUM SCH MG: 25 TABLET ORAL at 05:27

## 2019-02-01 RX ADMIN — SODIUM CHLORIDE SCH MLS: 0.9 INJECTION, SOLUTION INTRAVENOUS at 05:27

## 2019-02-01 RX ADMIN — Medication SCH ML: at 08:40

## 2019-02-01 RX ADMIN — TRAMADOL HYDROCHLORIDE PRN MG: 50 TABLET, COATED ORAL at 17:21

## 2019-02-01 RX ADMIN — SODIUM CHLORIDE SCH MLS: 0.9 INJECTION, SOLUTION INTRAVENOUS at 05:16

## 2019-02-01 NOTE — ECHO
HEIGHT: 5 ft 10 in   WEIGHT: 304 lb 0 oz   DATE OF STUDY: 02/01/2019   REFER DR: Kolby Jeronimo MD

2-DIMENSIONAL: YES

     M.MODE: YES

 DOPPLER: YES

COLOR FLOW: YES



                    TDS:  YES

PORTABLE: NO

 DEFINITY:  NO

BUBBLE STUDY: NO





DIAGNOSIS:  CONGESTIVE HEART FAILURE



CARDIAC HISTORY:  

CATHERIZATION: NO

SURGERY: NO

PROSTHETIC VALVE: NO

PACEMAKER: NO





MEASUREMENTS (cm)

    DIASTOLIC (NORMALS)                 SYSTOLIC (NORMALS)

IVSd                 1.5 (0.6-1.2)                    LA Diam 4.4 (1.9-4.0)     LVEF       
  69%  

LVIDd               4.8 (3.5-5.7)                        LVIDs      2.9 (2.0-3.5)     %FS  
        39%

LVPWd             1.3 (0.6-1.2)

Ao Diam           3.2 (2.0-3.7)



2 DIMENSIONAL ASSESSMENT:

RIGHT ATRIUM:                   DILATED

LEFT ATRIUM:       DILATED



RIGHT VENTRICLE:            DILATED

LEFT VENTRICLE: LEFT VENTRICULAR HYPERTROPHY



TRICUSPID VALVE:             NORMAL

MITRAL VALVE:     MITRAL ANNULAR CALCIFICATION



PULMONIC VALVE:             NORMAL

AORTIC VALVE:     SCLEROSIS



PERICARDIAL EFFUSION: NONE

AORTIC ROOT:      NORMAL





LEFT VENTRICULAR WALL MOTION:     NORMAL. 



DOPPLER/COLOR FLOW:     MILD TRICUSPID REGURGITATION. ESTIMATED RIGHT VENTRICULAR SYSTOLIC 
PRESSURE 45 MMHG. MILD PULMONARY HYPERTENSION. NO AORTIC STENOSIS. MILD AORTIC 
REGURGITATION. 



COMMENTS:      NORMAL LEFT VENTRICULAR EJECTION FRACTION. DILATED LEFT ATRIUM, RIGHT 
ATRIUM, RIGHT VENTRICULAR. LEFT VENTRICULAR HYPERTROPHY. MITRAL ANNULAR CALCIFICATION. 
AORTIC SCLEROSIS WITH NO AORTIC STENOSIS. MILD AORTIC REGURGITATION AND TRICUSPID 
REGURGITATION. MILD PULMONARY HYPERTENSION.



TECHNOLOGIST:   STEPHANIA OROPEZA/CORY FIGUEROA

## 2019-02-01 NOTE — P.PN
Subjective


Date of Service: 02/01/19


Chief Complaint:  necrotic heel ulcer-right foot


Subjective: No C/O voiced





Patient seen and examined at bedside.  No family at bedside.  Chart reviewed 

and case discussed with nursing staff.





Review of Systems


10-point ROS is otherwise unremarkable





Physical Examination





- Vital Signs


Temperature: 99.4 F


Blood Pressure: 106/55


Pulse: 97


Respirations: 20


Pulse Ox (%): 90





- Physical Exam


General: Alert, In no apparent distress, Oriented x3, Obese


HEENT: Atraumatic, PERRLA, EOMI


Neck: Supple, JVD not distended


Respiratory: Clear to auscultation bilaterally, Normal air movement


Cardiovascular: Regular rate/rhythm, Normal S1 S2


Gastrointestinal: Normal bowel sounds, No tenderness


Integumentary: Skin breakdown, Diabetic ulcer


Neurological: Normal speech, Normal tone, Normal affect





Assessment And Plan





- Plan








(1) Open wound of right heel





(2) Type 2 diabetes mellitus





(3) Spinal stenosis





(4) Neuropathy





1. Continue with IV antibiotic


2. Continue with local wound care


3. Wound care consultation/surgical consultation; recommendations appreciated


4. Gentle IV hydration


5. Monitor CBC


6. Strict blood sugar monitoring


7. Pain control


8. GI and DVT prophylaxis





Dispo: Pending Surgery recommendations regarding debridement.  


Time Spent Managing PTS Care (In Minutes): 35

## 2019-02-01 NOTE — P.PN
Date of Service: 02/01/19


S:  Patient of moving around, does not appear to be any pain or discomfort. 





 O:  Tries history Key skin on foot and lower legs.  On the plantar surface of 

her foot over the heel there is a circular area which corresponds to the port 

which she puts tracks when she walks.  The dressing that she has on to also 

have the heel out says it is lips when she has it on an this is expose this 

area. It is bruise, measures approximately 4 cm in diameter.  An palpation it 

is soft but appears to be viable it does mic.  I will not debrided at this 

time as I think it may turn out to be viable tissue. 





A:  The open wound on the plantar surface of foot





P:  Patient get in the shower washing her lower legs and feet tonight with soap 

and water to get some is dry dead skin off.  She does have appointments with 

dermatologist to treat her underlying psoriasis.  Recommend that she keeps her 

legs elevated. She may benefit from home health on discharge.  She could 

possibly be followed in the Wound Center when discharge.

## 2019-02-02 VITALS — TEMPERATURE: 98 F | SYSTOLIC BLOOD PRESSURE: 139 MMHG | DIASTOLIC BLOOD PRESSURE: 61 MMHG

## 2019-02-02 RX ADMIN — TRAMADOL HYDROCHLORIDE PRN MG: 50 TABLET, COATED ORAL at 04:58

## 2019-02-02 RX ADMIN — SODIUM CHLORIDE SCH MLS: 9 INJECTION, SOLUTION INTRAVENOUS at 05:02

## 2019-02-02 RX ADMIN — SODIUM CHLORIDE SCH: 0.9 INJECTION, SOLUTION INTRAVENOUS at 02:20

## 2019-02-02 RX ADMIN — ALBUTEROL SULFATE SCH MG: 2.5 SOLUTION RESPIRATORY (INHALATION) at 07:41

## 2019-02-02 RX ADMIN — SODIUM CHLORIDE SCH: 9 INJECTION, SOLUTION INTRAVENOUS at 12:00

## 2019-02-02 RX ADMIN — TRAMADOL HYDROCHLORIDE PRN MG: 50 TABLET, COATED ORAL at 11:53

## 2019-02-02 RX ADMIN — LEVOTHYROXINE SODIUM SCH MG: 25 TABLET ORAL at 05:00

## 2019-02-02 RX ADMIN — Medication SCH: at 09:00

## 2019-02-02 RX ADMIN — SODIUM CHLORIDE SCH: 9 INJECTION, SOLUTION INTRAVENOUS at 11:00

## 2019-02-02 RX ADMIN — Medication SCH APPL: at 13:55

## 2019-02-02 RX ADMIN — SODIUM CHLORIDE SCH MLS: 9 INJECTION, SOLUTION INTRAVENOUS at 00:10

## 2019-02-07 NOTE — P.DS
Admission Date: 01/30/19


Discharge Date: 02/02/19


Disposition: ROUTINE DISCHARGE


Discharge Condition: GOOD


Reason for Admission:  necrotic heel ulcer-right foot


Consultations: 





Dr. Pereyra, General surgery


Brief History of Present Illness: 


Patient is patient is a 68-year-old female who was brought to the hospital with 

a necrotic wound on her right heel.  Patient has a history of psoriasis as well 

as neuropathy secondary to spinal stenosis.  Patient also has type 2 diabetes.  

Patient has psoriatic lesions on her lower extremities.  She also has 

significant lower extremity edema.  She states she does not have a history of 

congestive heart failure.  She has had a ultrasound done 2 years ago with no 

cardiac abnormalities.  Patient will be admitted to the hospital for 

debridement of her necrotic wound on the right heel.


Hospital Course: 


(1) Open wound of right heel





(2) Type 2 diabetes mellitus





(3) Spinal stenosis





(4) Neuropathy





(5) Psoriasis of lower extremities. 





She was admitted for open wound on right heel. She was started on IV antibiotics

, general surgery was consulted as was wound care.  She was hydrated gently. 

She remained hemodynamically stable througout the stay.  She refused insulin 

and continued to take home metformin and ?other medications she had in her room 

(initially without telling nursing staff).  No surgical interventions, 

including debridement was planned by general surgery. They recommend wound care 

follow up, home health and antibiotics, along with follow up with her 

dermatologist.  She was then cleared for discharge from consultant point of 

view.  Home health was set up prior to discharge. She was discharged on PO 

bactrim.


She was otherwise stable. 


Vital Signs/Physical Exam: 














Temp Pulse Resp BP Pulse Ox


 


 98.0 F   96 H  18   139/61   97 


 


 02/02/19 12:00  02/02/19 12:00  02/02/19 12:00  02/02/19 12:00 02/02/19 12:00








General: Alert, In no apparent distress, Oriented x3


HEENT: Atraumatic, PERRLA, EOMI


Neck: Supple, JVD not distended


Respiratory: Clear to auscultation bilaterally, Normal air movement


Cardiovascular: Regular rate/rhythm, Normal S1 S2


Gastrointestinal: Normal bowel sounds, No tenderness


Musculoskeletal: No tenderness


Integumentary: Rash(es), Skin breakdown, Skin lesion, Diabetic ulcer


Neurological: Normal speech, Normal tone, Normal affect


Lymphatics: No axilla or inguinal lymphadenopathy


Laboratory Data at Discharge: 














WBC  8.5 K/uL (4.3-10.9)   01/31/19  03:21    


 


Hgb  11.7 g/dL (12.0-15.0)  L  01/31/19  03:21    


 


Hct  35.0 % (36.0-45.0)  L  01/31/19  03:21    


 


Plt Count  363 K/uL (152-406)   01/31/19  03:21    


 


PT  14.2 SECONDS (9.5-12.5)  H  01/31/19  03:21    


 


INR  1.20   01/31/19  03:21    


 


APTT  34.2 SECONDS (24.3-36.9)   01/31/19  03:21    


 


Sodium  139 mmol/L (136-145)   01/31/19  03:21    


 


Potassium  3.7 mmol/L (3.5-5.1)   01/31/19  03:21    


 


BUN  18 mg/dL (7-18)   01/31/19  03:21    


 


Creatinine  0.99 mg/dL (0.55-1.3)   01/31/19  03:21    


 


Glucose  162 mg/dL ()  H  01/31/19  03:21    


 


Total Bilirubin  0.4 mg/dL (0.2-1.0)   01/30/19  19:20    


 


AST  21 U/L (15-37)   01/30/19  19:20    


 


ALT  21 U/L (12-78)   01/30/19  19:20    


 


Alkaline Phosphatase  88 U/L ()   01/30/19  19:20    








Home Medications: 








Albuterol Neb [Proventil 0.083% Neb Soln] 2.5 mg IH BID 01/31/19 


Levothyroxine Sodium 25 mcg PO [id number] 01/31/19 


Metformin ER [Glucophage ER*] 500 mg PO DAILY 01/31/19 


Triamterene/Hydrochlorothiazid [Triamterene-Hctz 75-50 mg Tab] 1 each PO DAILY 

01/31/19 


Tryptophan [l-Tryptophan] 1,000 mg PO BEDTIME 01/31/19 


Tryptophan [l-Tryptophan] 500 mg PO BEDTIME PRN 01/31/19 


clonazePAM [Clonazepam] 0.25 mg PO BEDTIME 01/31/19 


clonazePAM [Klonopin*] 0.5 mg PO BID 01/31/19 


hydrOXYzine pamoate [Hydroxyzine Pamoate] 1 - 2 cap PO SEECOM PRN 01/31/19 


predniSONE [Prednisone*] 5 mg PO TID 01/31/19 


Medihoney [Medihoney Woundcare Gel*] 1 appl TOP DAILY #0 tube 02/02/19 


Sulfamethoxazole/Trimethoprim [Bactrim Ds Tablet] 1 each PO BID #28 tablet 02/02 /19 


traMADol HCL [Ultram*] 50 mg PO Q6H PRN #10 tab 02/02/19 





New Medications: 


Sulfamethoxazole/Trimethoprim [Bactrim Ds Tablet] 1 each PO BID #28 tablet


traMADol HCL [Ultram*] 50 mg PO Q6H PRN #10 tab


 PRN Reason: Pain


Patient Discharge Instructions: Please follow up with the primary care 

physician in 1 week.  Please follow up with the wound care clinic in 1 week.


Diet: ADA


Activity: Ad leighann


Followup: 


Reagan Pereyra MD [ACTIVE - CAN ADMIT] - 1-2 Weeks (Please call the clinic to 

schedule an appointment)


Time spent managing pt's care (in minutes): 55

## 2019-02-18 ENCOUNTER — HOSPITAL ENCOUNTER (INPATIENT)
Dept: HOSPITAL 97 - 2ND | Age: 69
LOS: 1 days | Discharge: HOME HEALTH | DRG: 623 | End: 2019-02-19
Attending: INTERNAL MEDICINE | Admitting: INTERNAL MEDICINE
Payer: COMMERCIAL

## 2019-02-18 VITALS — BODY MASS INDEX: 43 KG/M2

## 2019-02-18 DIAGNOSIS — Z79.84: ICD-10-CM

## 2019-02-18 DIAGNOSIS — R59.1: ICD-10-CM

## 2019-02-18 DIAGNOSIS — E03.9: ICD-10-CM

## 2019-02-18 DIAGNOSIS — E11.42: ICD-10-CM

## 2019-02-18 DIAGNOSIS — F17.210: ICD-10-CM

## 2019-02-18 DIAGNOSIS — M48.00: ICD-10-CM

## 2019-02-18 DIAGNOSIS — L03.115: ICD-10-CM

## 2019-02-18 DIAGNOSIS — E11.621: Primary | ICD-10-CM

## 2019-02-18 DIAGNOSIS — Z79.4: ICD-10-CM

## 2019-02-18 DIAGNOSIS — L97.418: ICD-10-CM

## 2019-02-18 LAB
BUN BLD-MCNC: 16 MG/DL (ref 7–18)
GLUCOSE SERPLBLD-MCNC: 138 MG/DL (ref 74–106)
HCT VFR BLD CALC: 34.3 % (ref 36–45)
INR BLD: 1.33
LYMPHOCYTES # SPEC AUTO: 1.7 K/UL (ref 0.7–4.9)
PMV BLD: 7.7 FL (ref 7.6–11.3)
POTASSIUM SERPL-SCNC: 3.7 MMOL/L (ref 3.5–5.1)
RBC # BLD: 3.7 M/UL (ref 3.86–4.86)
UA COMPLETE W REFLEX CULTURE PNL UR: (no result)
UA DIPSTICK W REFLEX MICRO PNL UR: (no result)

## 2019-02-18 PROCEDURE — 88304 TISSUE EXAM BY PATHOLOGIST: CPT

## 2019-02-18 PROCEDURE — 85610 PROTHROMBIN TIME: CPT

## 2019-02-18 PROCEDURE — 93925 LOWER EXTREMITY STUDY: CPT

## 2019-02-18 PROCEDURE — 87077 CULTURE AEROBIC IDENTIFY: CPT

## 2019-02-18 PROCEDURE — 87205 SMEAR GRAM STAIN: CPT

## 2019-02-18 PROCEDURE — 36415 COLL VENOUS BLD VENIPUNCTURE: CPT

## 2019-02-18 PROCEDURE — 71046 X-RAY EXAM CHEST 2 VIEWS: CPT

## 2019-02-18 PROCEDURE — 87075 CULTR BACTERIA EXCEPT BLOOD: CPT

## 2019-02-18 PROCEDURE — 93005 ELECTROCARDIOGRAM TRACING: CPT

## 2019-02-18 PROCEDURE — 94640 AIRWAY INHALATION TREATMENT: CPT

## 2019-02-18 PROCEDURE — 85025 COMPLETE CBC W/AUTO DIFF WBC: CPT

## 2019-02-18 PROCEDURE — 87088 URINE BACTERIA CULTURE: CPT

## 2019-02-18 PROCEDURE — 82962 GLUCOSE BLOOD TEST: CPT

## 2019-02-18 PROCEDURE — 85730 THROMBOPLASTIN TIME PARTIAL: CPT

## 2019-02-18 PROCEDURE — 81003 URINALYSIS AUTO W/O SCOPE: CPT

## 2019-02-18 PROCEDURE — 87086 URINE CULTURE/COLONY COUNT: CPT

## 2019-02-18 PROCEDURE — 87070 CULTURE OTHR SPECIMN AEROBIC: CPT

## 2019-02-18 PROCEDURE — 80048 BASIC METABOLIC PNL TOTAL CA: CPT

## 2019-02-18 PROCEDURE — 81015 MICROSCOPIC EXAM OF URINE: CPT

## 2019-02-18 PROCEDURE — 87186 SC STD MICRODIL/AGAR DIL: CPT

## 2019-02-18 RX ADMIN — ALBUTEROL SULFATE SCH MG: 2.5 SOLUTION RESPIRATORY (INHALATION) at 20:00

## 2019-02-18 RX ADMIN — PIPERACILLIN SODIUM AND TAZOBACTAM SODIUM SCH MLS: 3; .375 INJECTION, POWDER, LYOPHILIZED, FOR SOLUTION INTRAVENOUS at 16:46

## 2019-02-18 RX ADMIN — Medication SCH ML: at 21:58

## 2019-02-18 RX ADMIN — METFORMIN HYDROCHLORIDE SCH: 500 TABLET, EXTENDED RELEASE ORAL at 18:00

## 2019-02-18 RX ADMIN — SODIUM CHLORIDE SCH MLS: 0.9 INJECTION, SOLUTION INTRAVENOUS at 21:57

## 2019-02-18 NOTE — RAD REPORT
EXAM DESCRIPTION:  US - Lower Extremity Arterial Bilat - 2/18/2019 7:53 pm

 

CLINICAL HISTORY:  BOTH LEGS, PVD.

Peripheral vascular disease.

 

COMPARISON:  No comparisons

 

TECHNIQUE:  Bilateral lower extremity arterial Doppler examination was performed with waveform tracin
g and great toe- brachial pressure measurements.

 

FINDINGS:  Symmetric brachial pressure measurements are noted.

 

Triphasic waveform is seen in the right common femoral artery. The right superficial femoral artery, 
popliteal artery, posterior tibial artery and dorsalis pedis artery demonstrates biphasic to monophas
ic waveforms with blunted amplitude.

 

Triphasic waveform is seen the left common femoral artery, superficial femoral artery. Left popliteal
 artery is biphasic. Left posterior tibial and dorsalis pedis artery appears monophasic.

 

Right great toe- brachial index measures 0.4, abnormally reduced.

 

Left great toe- brachial index measures 0.6, normal.

 

 

IMPRESSION:  Moderate right-sided peripheral vascular disease is seen with change in waveform noted a
t the level of the right SFA, which could indicate a focal stenosis in this vessel.

 

Mild left-sided peripheral vascular disease is seen, however, most prominent distally, a pattern comm
only seen in diabetes.

## 2019-02-18 NOTE — CON
Date of Consultation:  2019



Reason:  Needs surgical debridement of right heel.



History Of Present Illness:  The patient is a 68-year-old female, who has had this wound for several 
weeks now.  She was admitted last month and was discharged, was seen in [facility] Care Center today, and 
she had a necrotic eschar with some fluctuance and surrounding erythema, warmth, and edema.  She was 
admitted.  MRI has been ordered.  She is started on antibiotics, and I was consulted for surgical sangita
ridement.  She is awake, alert with no purulent discharge, and she has had some low-grade fevers at h
ome, none in the hospital.



Review of Systems:

Otherwise unremarkable.



Past Medical History:  Hypothyroidism; lymphedema, both lower extremities; neuropathy; type 2 diabete
s; spinal stenosis.



Allergies:  INCLUDE CIPRO.



Social History:  She does smoke, has been counselled, and drinks occasionally.



Past Surgical History:  Tonsillectomy and .



Physical Examination:

Vital signs:  Stable.  She is afebrile. 

General:  She is awake, alert, and oriented x3. 

Head and Neck:  No masses. 

Chest:  Clear. 

Heart:  S1, S2. 

Abdomen:  Soft. 

Extremities:  Marked lymphedema, right leg greater than left.  There is some weeping of fluid on the 
anterior part of the leg.  Around the ankle, there is erythema, little bit of edema, and warmth; and 
the heel, there is approximately an 8 x 6 cm area of necrotic eschar with fluctuance, no open wound, 
no purulent discharge.  Markedly diminished dorsalis pedis and posterior tibial pulses.



Diagnostic Studies:  She had a venous Doppler, that shows no DVT and that was in December.  She had a
 foot x-ray done on , showed calcaneal spurs.  No destructive bony lesions were seen.  He
r laboratory data from today is currently pending.



Assessment:  A 68-year-old female, with infected wound, right heel with cellulitis.



Recommendations:  We will debride the wound in the operating room tomorrow morning.  The patient will
 be started on antibiotics.  We will await the results of the blood work as well as the MRI, and see 
if she needs long-term IV antibiotics.





AS/MODL

DD:  2019 14:54:28Voice ID:  [id number]

DT:  2019 18:08:43Report ID:  [id number]

## 2019-02-18 NOTE — RAD REPORT
EXAM DESCRIPTION:  RAD - Chest Pa And Lat (2 Views) - 2/18/2019 8:09 pm

 

CLINICAL HISTORY:  osteomyelitis R foot

Chest pain.

 

COMPARISON:  No comparisons

 

FINDINGS:  The lungs appear mildly emphysematous but clear of acute infiltrate. The heart is moderate
ly enlarged in size. No displaced fractures.

## 2019-02-18 NOTE — P.HP
Certification for Inpatient


Patient admitted to: Inpatient


With expected LOS: >2 Midnights


Practitioner: I am a practitioner with admitting privileges, knowledge of 

patient current condition, hospital course, and medical plan of care.


Services: Services provided to patient in accordance with Admission 

requirements found in Title 42 Section 412.3 of the Code of Federal Regulations





Patient History


Date of Service: 02/18/19


Reason for admission: RIGHT HEEL ULCER.


History of Present Illness: 





MR RINCON HAS A DIABETIC ULCER THAT IS GETTING WORSE DESPITE OUTPATIENT 

THERAPY WITH SANTYL AFTER DEBRIDEMENT ON LAST VISIT.  SHE HAS MORE AREA OS 

ESCHAR ON TOP OF PREVIOUS DEBRIDEMENT.  I DECIDED TO ADMIT HER FOR SURGICAL 

DEBRIDEMENT.  


Allergies





ciprofloxacin Allergy (Verified 01/30/19 22:43)


 Unknown





Home Medications: 








Albuterol Neb [Proventil 0.083% Neb Soln] 2.5 mg IH BID 01/31/19 


Levothyroxine Sodium 25 mcg PO OTCQM3IL 01/31/19 


Metformin ER [Glucophage ER*] 500 mg PO TID 01/31/19 


Triamterene/Hydrochlorothiazid [Triamterene-Hctz 75-50 mg Tab] 1 each PO DAILY 

01/31/19 


Tryptophan [l-Tryptophan] 1,000 mg PO BEDTIME 01/31/19 


clonazePAM [Klonopin*] 0.5 mg PO BID 01/31/19 


hydrOXYzine pamoate [Hydroxyzine Pamoate] 1 - 2 cap PO SEECOM PRN 01/31/19 








- Past Medical/Surgical History


Has patient received pneumonia vaccine in the past: Yes


Diabetic: Yes


-: lymphedema


-: diabetic


-: hypothyroid


-: tonsilectomy


-: c-sdection





- Social History


Smoking Status: Current every day smoker


Alcohol use: Yes


CD- Drugs: No


Caffeine use: No


Place of Residence: Home





Review of Systems


10-point ROS is otherwise unremarkable


Cardiovascular: Edema, As per HPI





Physical Examination





- Vital Signs


Temperature: 98.8 F


Blood Pressure: 128/66


Pulse: 108


Respirations: 20


Pulse Ox (%): 92





- Physical Exam


General: Alert, Mild distress, Obese, Other (HYGIENE OF SKIN IS POOR.)


HEENT: Atraumatic, PERRLA, Mucous membr. moist/pink, EOMI, Sclerae nonicteric


Neck: Supple, 2+ carotid pulse no bruit, No LAD, Without JVD or thyroid 

abnormality


Respiratory: Clear to auscultation bilaterally, Normal air movement


Cardiovascular: Regular rate/rhythm, Normal S1 S2, Edema


Gastrointestinal: Normal bowel sounds, No tenderness


Musculoskeletal: No tenderness


Integumentary: No rashes


Neurological: Normal gait, Normal speech, Normal strength at 5/5 x4 extr, 

Normal tone, Normal affect


Lymphatics: No axilla or inguinal lymphadenopathy





- Studies


Laboratory Data (last 24 hrs)





02/18/19 15:45: Sodium 135 L, Potassium 3.7, BUN 16, Creatinine 0.80, Glucose 

138 H


02/18/19 15:45: PT 15.5 H, INR 1.33, APTT 35.6


02/18/19 15:45: WBC 11.6 H D, Hgb 11.3 L, Hct 34.3 L, Plt Count 461 H








Assessment and Plan





- Problems (Diagnosis)


(1) Diabetic ulcer of right heel


Current Visit: No   Status: Chronic   


Plan: 


RIGHT HEEL ULCER WITH PREGANGRENOUS STATUS.


PVD RELATED TO NICOTINE.


EDEMA 


OBESITY. 





ORDER ARTERIAL DOPPLER. ORDER IV ABX ZOSYN





DR. CARVER CONSULTED TO DEBRIDE. 


Qualifiers: 


   Diabetes mellitus type: type 2 





(2) Lymphedema of both lower extremities


Current Visit: No   Status: Acute   





(3) Open wound of right heel


Onset Date: 01/31/19   Current Visit: No   Status: Acute   





- Advance Directives


Does patient have a Living Will: No


Does patient have a Durable POA for Healthcare: No

## 2019-02-19 VITALS — DIASTOLIC BLOOD PRESSURE: 74 MMHG | SYSTOLIC BLOOD PRESSURE: 128 MMHG | TEMPERATURE: 98.4 F

## 2019-02-19 VITALS — OXYGEN SATURATION: 94 %

## 2019-02-19 PROCEDURE — 0JBQ0ZZ EXCISION OF RIGHT FOOT SUBCUTANEOUS TISSUE AND FASCIA, OPEN APPROACH: ICD-10-PCS

## 2019-02-19 RX ADMIN — Medication SCH: at 09:00

## 2019-02-19 RX ADMIN — BUPIVACAINE HYDROCHLORIDE ONE: 5 INJECTION, SOLUTION EPIDURAL; INTRACAUDAL; PERINEURAL at 10:14

## 2019-02-19 RX ADMIN — ALBUTEROL SULFATE SCH MG: 2.5 SOLUTION RESPIRATORY (INHALATION) at 08:37

## 2019-02-19 RX ADMIN — METFORMIN HYDROCHLORIDE SCH: 500 TABLET, EXTENDED RELEASE ORAL at 16:54

## 2019-02-19 RX ADMIN — METFORMIN HYDROCHLORIDE SCH: 500 TABLET, EXTENDED RELEASE ORAL at 08:00

## 2019-02-19 RX ADMIN — PIPERACILLIN SODIUM AND TAZOBACTAM SODIUM SCH MLS: 3; .375 INJECTION, POWDER, LYOPHILIZED, FOR SOLUTION INTRAVENOUS at 09:29

## 2019-02-19 RX ADMIN — SODIUM CHLORIDE SCH MLS: 0.9 INJECTION, SOLUTION INTRAVENOUS at 14:20

## 2019-02-19 RX ADMIN — BUPIVACAINE HYDROCHLORIDE ONE ML: 5 INJECTION, SOLUTION EPIDURAL; INTRACAUDAL; PERINEURAL at 10:42

## 2019-02-19 RX ADMIN — PIPERACILLIN SODIUM AND TAZOBACTAM SODIUM SCH MLS: 3; .375 INJECTION, POWDER, LYOPHILIZED, FOR SOLUTION INTRAVENOUS at 00:36

## 2019-02-19 RX ADMIN — PIPERACILLIN SODIUM AND TAZOBACTAM SODIUM SCH: 3; .375 INJECTION, POWDER, LYOPHILIZED, FOR SOLUTION INTRAVENOUS at 16:54

## 2019-02-19 RX ADMIN — METFORMIN HYDROCHLORIDE SCH: 500 TABLET, EXTENDED RELEASE ORAL at 12:00

## 2019-02-19 RX ADMIN — BUPIVACAINE HYDROCHLORIDE ONE ML: 5 INJECTION, SOLUTION EPIDURAL; INTRACAUDAL; PERINEURAL at 10:30

## 2019-02-19 NOTE — OP
Date of Procedure:  02/19/2019



Surgeon:  Esteban Almazan MD



Preoperative Diagnosis:  Infected wound, right heel.



Postoperative Diagnosis:  Infected wound, right heel.



Procedure:  Excisional debridement of right heel wound to subcutaneous tissue, 8 x 6 cm.



Estimated Blood Loss:  Minimal.



Specimen:  Infected tissue.



Findings:  As above.



Anesthesia:  MAC.



Complications:  None.



Disposition:  The patient tolerated the procedure in stable condition and was taken to recovery in go
od general condition.



Procedure In Detail:  The patient was brought to the OR and placed in supine position.  MAC anesthesi
a was begun.  The patient placed in left lateral position, prepped and draped in the usual sterile fa
shion.  Lidocaine 1% infiltrated locally.  Then, sharp dissection proceeded with cautery, and the ent
sujata 8 x 6 cm necrotic eschar with purulence excised.  Cultures done on the tissue and sent to Patholo
gy.  Wound irrigated.  Minimal bleeding noted, controlled easily with 

pressure and cautery, then collagenase dressing applied.  The patient was awakened and taken to [facility] in good general condition.





AS/SOFÍA

DD:  02/19/2019 11:22:36Voice ID:  [id number]

DT:  02/19/2019 22:27:50Report ID:  [id number]

## 2019-02-19 NOTE — P.OP
Preoperative diagnosis: Infected wound Right Heel


Postoperative diagnosis: same


Primary procedure: Excisional Debridement Right Heel wound 8x6 cm to SQ


Anesthesia: MAC


Estimated blood loss: min


Specimen: Infected Tissue


Findings: as above


Complications: None


Transferred to: Recovery Room


Condition: Good

## 2019-02-19 NOTE — EKG
Test Date:    2019-02-18               Test Time:    16:25:25

Technician:   SANTIAGO                                     

                                                     

MEASUREMENT RESULTS:                                       

Intervals:                                           

Rate:         100                                    

FL:                                                  

QRSD:         92                                     

QT:           368                                    

QTc:          474                                    

Axis:                                                

P:                                                   

FL:                                                  

QRS:          236                                    

T:            72                                     

                                                     

INTERPRETIVE STATEMENTS:                                       

                                                     

Atrial fibrillation

Right superior axis deviation

Low voltage QRS

Incomplete right bundle branch block

Cannot rule out Anterior infarct, age undetermined

Abnormal ECG

Compared to ECG 01/31/2019 07:51:02

Right superior axis now present

Low QRS voltage now present

Myocardial infarct finding now present



Electronically Signed On 02-19-19 08:46:14 CST by Diego Salinas

## 2019-02-19 NOTE — P.DS
Admission Date: 02/18/19


Discharge Date: 02/19/19


Disposition: ROUTINE DISCHARGE


Discharge Condition: FAIR


Reason for Admission: RIGHT HEEL ULCER.





- Problems


(1) Diabetic ulcer of right heel


Status: Chronic   


Qualifiers: 


   Diabetes mellitus type: type 2 





(2) Lymphedema of both lower extremities


Status: Acute   





(3) Open wound of right heel


Onset Date: 01/31/19   Status: Acute   


Brief History of Present Illness: 





MR RINCON HAS A DIABETIC ULCER THAT IS GETTING WORSE DESPITE OUTPATIENT 

THERAPY WITH SANTYL AFTER DEBRIDEMENT ON LAST VISIT.  SHE HAS MORE AREA OS 

ESCHAR ON TOP OF PREVIOUS DEBRIDEMENT.  I DECIDED TO ADMIT HER FOR SURGICAL 

DEBRIDEMENT.  


Vital Signs/Physical Exam: 














Temp Pulse Resp BP Pulse Ox


 


 98.4 F   98 H  20   128/74   92 


 


 02/19/19 16:00  02/19/19 16:00  02/19/19 16:00  02/19/19 16:00  02/19/19 16:00








Laboratory Data at Discharge: 














WBC  11.6 K/uL (4.3-10.9)  H D 02/18/19  15:45    


 


Hgb  11.3 g/dL (12.0-15.0)  L  02/18/19  15:45    


 


Hct  34.3 % (36.0-45.0)  L  02/18/19  15:45    


 


Plt Count  461 K/uL (152-406)  H  02/18/19  15:45    


 


PT  15.5 SECONDS (9.5-12.5)  H  02/18/19  15:45    


 


INR  1.33   02/18/19  15:45    


 


APTT  35.6 SECONDS (24.3-36.9)   02/18/19  15:45    


 


Sodium  135 mmol/L (136-145)  L  02/18/19  15:45    


 


Potassium  3.7 mmol/L (3.5-5.1)   02/18/19  15:45    


 


BUN  16 mg/dL (7-18)   02/18/19  15:45    


 


Creatinine  0.80 mg/dL (0.55-1.3)   02/18/19  15:45    


 


Glucose  138 mg/dL ()  H  02/18/19  15:45    








Home Medications: 








Albuterol Neb [Proventil 0.083% Neb Soln] 2.5 mg IH BID 01/31/19 


Metformin ER [Glucophage ER*] 500 mg PO TID 01/31/19 


Triamterene/Hydrochlorothiazid [Triamterene-Hctz 75-50 mg Tab] 1 each PO DAILY 

01/31/19 


Tryptophan [l-Tryptophan] 1,000 mg PO BEDTIME 01/31/19 


clonazePAM [Klonopin*] 0.5 mg PO BID 01/31/19 


hydrOXYzine pamoate [Hydroxyzine Pamoate] 1 - 2 cap PO SEECOM PRN 01/31/19 


Ciprofloxacin HCl [Cipro 500 MG Tablet] 500 mg PO BID #72 tab 02/19/19 


Doxycycline Hyclate 100 mg PO BID #28 tablet 02/19/19 





New Medications: 


Ciprofloxacin HCl [Cipro 500 MG Tablet] 500 mg PO BID #72 tab


Doxycycline Hyclate 100 mg PO BID #28 tablet


Followup: 


Fernandez Tolbert MD [Primary Care Provider] - 1-2 Weeks


Esteban Almazan MD [ACTIVE - CAN ADMIT] -  (In the wound healing center.)

## 2019-03-01 ENCOUNTER — HOSPITAL ENCOUNTER (INPATIENT)
Dept: HOSPITAL 97 - ER | Age: 69
LOS: 5 days | Discharge: NURSING, REHAB OR LONG-TERM CARE | DRG: 629 | End: 2019-03-06
Attending: INTERNAL MEDICINE | Admitting: HOSPITALIST
Payer: COMMERCIAL

## 2019-03-01 VITALS — BODY MASS INDEX: 37.3 KG/M2

## 2019-03-01 DIAGNOSIS — M48.00: ICD-10-CM

## 2019-03-01 DIAGNOSIS — B96.20: ICD-10-CM

## 2019-03-01 DIAGNOSIS — Z79.84: ICD-10-CM

## 2019-03-01 DIAGNOSIS — E03.9: ICD-10-CM

## 2019-03-01 DIAGNOSIS — E11.621: Primary | ICD-10-CM

## 2019-03-01 DIAGNOSIS — L40.9: ICD-10-CM

## 2019-03-01 DIAGNOSIS — E11.69: ICD-10-CM

## 2019-03-01 DIAGNOSIS — L97.414: ICD-10-CM

## 2019-03-01 DIAGNOSIS — F41.9: ICD-10-CM

## 2019-03-01 DIAGNOSIS — E11.52: ICD-10-CM

## 2019-03-01 DIAGNOSIS — I89.0: ICD-10-CM

## 2019-03-01 DIAGNOSIS — Z79.4: ICD-10-CM

## 2019-03-01 DIAGNOSIS — E11.42: ICD-10-CM

## 2019-03-01 DIAGNOSIS — E66.01: ICD-10-CM

## 2019-03-01 DIAGNOSIS — F17.210: ICD-10-CM

## 2019-03-01 DIAGNOSIS — M86.171: ICD-10-CM

## 2019-03-01 LAB
ALBUMIN SERPL BCP-MCNC: 2.3 G/DL (ref 3.4–5)
ALP SERPL-CCNC: 93 U/L (ref 45–117)
ALT SERPL W P-5'-P-CCNC: 56 U/L (ref 12–78)
AST SERPL W P-5'-P-CCNC: 46 U/L (ref 15–37)
BLD SMEAR INTERP: (no result)
BUN BLD-MCNC: 21 MG/DL (ref 7–18)
GLUCOSE SERPLBLD-MCNC: 190 MG/DL (ref 74–106)
HCT VFR BLD CALC: 32.2 % (ref 36–45)
INR BLD: 1.68
LYMPHOCYTES # SPEC AUTO: 1.1 K/UL (ref 0.7–4.9)
MORPHOLOGY BLD-IMP: (no result)
PMV BLD: 6.9 FL (ref 7.6–11.3)
POTASSIUM SERPL-SCNC: 4 MMOL/L (ref 3.5–5.1)
RBC # BLD: 3.51 M/UL (ref 3.86–4.86)

## 2019-03-01 PROCEDURE — 87040 BLOOD CULTURE FOR BACTERIA: CPT

## 2019-03-01 PROCEDURE — 83036 HEMOGLOBIN GLYCOSYLATED A1C: CPT

## 2019-03-01 PROCEDURE — 80048 BASIC METABOLIC PNL TOTAL CA: CPT

## 2019-03-01 PROCEDURE — 87075 CULTR BACTERIA EXCEPT BLOOD: CPT

## 2019-03-01 PROCEDURE — 96365 THER/PROPH/DIAG IV INF INIT: CPT

## 2019-03-01 PROCEDURE — 96375 TX/PRO/DX INJ NEW DRUG ADDON: CPT

## 2019-03-01 PROCEDURE — 84145 PROCALCITONIN (PCT): CPT

## 2019-03-01 PROCEDURE — 36415 COLL VENOUS BLD VENIPUNCTURE: CPT

## 2019-03-01 PROCEDURE — 93005 ELECTROCARDIOGRAM TRACING: CPT

## 2019-03-01 PROCEDURE — 83735 ASSAY OF MAGNESIUM: CPT

## 2019-03-01 PROCEDURE — 87070 CULTURE OTHR SPECIMN AEROBIC: CPT

## 2019-03-01 PROCEDURE — 87077 CULTURE AEROBIC IDENTIFY: CPT

## 2019-03-01 PROCEDURE — 85025 COMPLETE CBC W/AUTO DIFF WBC: CPT

## 2019-03-01 PROCEDURE — 97542 WHEELCHAIR MNGMENT TRAINING: CPT

## 2019-03-01 PROCEDURE — 97530 THERAPEUTIC ACTIVITIES: CPT

## 2019-03-01 PROCEDURE — 83605 ASSAY OF LACTIC ACID: CPT

## 2019-03-01 PROCEDURE — 82550 ASSAY OF CK (CPK): CPT

## 2019-03-01 PROCEDURE — 88311 DECALCIFY TISSUE: CPT

## 2019-03-01 PROCEDURE — 96361 HYDRATE IV INFUSION ADD-ON: CPT

## 2019-03-01 PROCEDURE — 82962 GLUCOSE BLOOD TEST: CPT

## 2019-03-01 PROCEDURE — 71045 X-RAY EXAM CHEST 1 VIEW: CPT

## 2019-03-01 PROCEDURE — 87186 SC STD MICRODIL/AGAR DIL: CPT

## 2019-03-01 PROCEDURE — 87205 SMEAR GRAM STAIN: CPT

## 2019-03-01 PROCEDURE — 81003 URINALYSIS AUTO W/O SCOPE: CPT

## 2019-03-01 PROCEDURE — 88304 TISSUE EXAM BY PATHOLOGIST: CPT

## 2019-03-01 PROCEDURE — 81015 MICROSCOPIC EXAM OF URINE: CPT

## 2019-03-01 PROCEDURE — 85610 PROTHROMBIN TIME: CPT

## 2019-03-01 PROCEDURE — 84100 ASSAY OF PHOSPHORUS: CPT

## 2019-03-01 PROCEDURE — 99285 EMERGENCY DEPT VISIT HI MDM: CPT

## 2019-03-01 PROCEDURE — 80076 HEPATIC FUNCTION PANEL: CPT

## 2019-03-01 PROCEDURE — 97163 PT EVAL HIGH COMPLEX 45 MIN: CPT

## 2019-03-01 NOTE — EDPHYS
Physician Documentation                                                                           

 [facility]                                                                

Name: Selina Arenas                                                                               

Age: 68 yrs                                                                                       

Sex: Female                                                                                       

: 1950                                                                                   

MRN: [id number]                                                                                   

Arrival Date: 2019                                                                          

Time: 18:59                                                                                       

Account#: [id number]                                                                            

Bed 5                                                                                             

Private MD:                                                                                       

ED Physician Huey Bangura                                                                      

HPI:                                                                                              

                                                                                             

20:29 This 68 yrs old  Female presents to ER via Wheelchair with complaints of Foot  jr8 

      Pain.                                                                                       

20:29 Patient came to ED under the direction of her general surgeon who has been taking care  jr8 

      of a right foot wound. Stated that the wound is not getting better and is in bone.          

      Stated that she was told to come to be admitted and then will be transferred to LTAC        

      for after a few days. Patient currently with fever and tachycardic .                        

                                                                                                  

Historical:                                                                                       

- Allergies:                                                                                      

19:13 Ciprofloxacin; "makes my mouth dry";                                                    jd3 

- Home Meds:                                                                                      

19:13 Clonazepam Oral [Active]; metformin 500 mg Oral tab 1 tab 2 times per day [Active];     jd3 

      triamterene-hydrochlorothiazid 75-50 mg Oral tab 1 tab once daily [Active];                 

- PMHx:                                                                                           

19:13 Anxiety; Diabetes - NIDDM; psoriasis;                                                   jd3 

- PSHx:                                                                                           

19:13 ; Tonsillectomy; right foot;                                                   jd3 

                                                                                                  

- Immunization history:: Adult Immunizations up to date.                                          

- Social history:: Smoking status: Patient uses tobacco products, smokes one-half pack            

  cigarettes per day.                                                                             

- Ebola Screening: : Patient negative for fever greater than or equal to 101.5 degrees            

  Fahrenheit, and additional compatible Ebola Virus Disease symptoms.                             

                                                                                                  

                                                                                                  

ROS:                                                                                              

20:29 Eyes: Negative for injury, pain, redness, and discharge, ENT: Negative for injury,      jr8 

      pain, and discharge, Neck: Negative for injury, pain, and swelling, Cardiovascular:         

      Negative for chest pain, palpitations, and edema, Respiratory: Negative for shortness       

      of breath, cough, wheezing, and pleuritic chest pain, Abdomen/GI: Negative for              

      abdominal pain, nausea, vomiting, diarrhea, and constipation, Back: Negative for injury     

      and pain, Neuro: Negative for headache, weakness, numbness, tingling, and seizure.          

20:29 Constitutional: Positive for fever.                                                         

20:29 MS/extremity: Positive for erythema, pain, swelling, tenderness, of the right foot.         

20:29 Skin: Positive for erythema.                                                                

                                                                                                  

Exam:                                                                                             

20:29 Eyes:  Pupils equal round and reactive to light, extra-ocular motions intact.  Lids and jr8 

      lashes normal.  Conjunctiva and sclera are non-icteric and not injected.  Cornea within     

      normal limits.  Periorbital areas with no swelling, redness, or edema. ENT:  Nares          

      patent. No nasal discharge, no septal abnormalities noted.  Tympanic membranes are          

      normal and external auditory canals are clear.  Oropharynx with no redness, swelling,       

      or masses, exudates, or evidence of obstruction, uvula midline.  Mucous membranes           

      moist. Neck:  Trachea midline, no thyromegaly or masses palpated, and no cervical           

      lymphadenopathy.  Supple, full range of motion without nuchal rigidity, or vertebral        

      point tenderness.  No Meningismus. Cardiovascular:  Regular rate and rhythm with a          

      normal S1 and S2.  No gallops, murmurs, or rubs.  Normal PMI, no JVD.  No pulse             

      deficits. Respiratory:  Lungs have equal breath sounds bilaterally, clear to                

      auscultation and percussion.  No rales, rhonchi or wheezes noted.  No increased work of     

      breathing, no retractions or nasal flaring. Abdomen/GI:  Soft, non-tender, with normal      

      bowel sounds.  No distension or tympany.  No guarding or rebound.  No evidence of           

      tenderness throughout. Back:  No spinal tenderness.  No costovertebral tenderness.          

      Full range of motion. Neuro:  Awake and alert, GCS 15, oriented to person, place, time,     

      and situation.  Cranial nerves II-XII grossly intact.  Motor strength 5/5 in all            

      extremities.  Sensory grossly intact.  Cerebellar exam normal.  Normal gait.                

20:29 Musculoskeletal/extremity: Extremities: grossly normal except: noted in the right foot:     

      Patents right foot is swollen and red. Erythema extends to mid tibia. Warm to touch.        

      Large open wound noted to the right heal. Foul smelling odor present , ROM: intact in       

      all extremities, Circulation is intact in all extremities. Sensation intact.                

                                                                                                  

Vital Signs:                                                                                      

19:08  / 70; Pulse 138; Resp 19 S; Temp 101.1(O); Pulse Ox 94% on R/A; Weight 113.4 kg  jd3 

      (R); Height 5 ft. 10 in. (177.80 cm) (R); Pain 4/10;                                        

20:54  / 62; Pulse 112; Resp 19; Temp 101.5(O); Pulse Ox 95% ;                          ea  

21:58  / 73; Pulse 99; Resp 18; Pulse Ox 96% on R/A;                                    ea  

22:03 Temp 99.1(O);                                                                           ea  

22:46  / 67; Pulse 88; Resp 18; Pulse Ox 96% ;                                          ea  

19:08 Body Mass Index 35.87 (113.40 kg, 177.80 cm)                                            jd3 

                                                                                                  

MDM:                                                                                              

19:37 Patient medically screened.                                                             [facility] 

20:54 Data reviewed: vital signs, nurses notes, lab test result(s), and as a result, I will   [facility] 

      admit patient. Data interpreted: Pulse oximetry: on room air is 94 %. Interpretation:       

      normal. Counseling: I had a detailed discussion with the patient and/or guardian            

      regarding: the historical points, exam findings, and any diagnostic results supporting      

      the discharge/admit diagnosis, lab results, the need for further work-up and treatment      

      in the hospital.                                                                            

                                                                                                  

                                                                                             

20:12 Order name: Basic Metabolic Panel; Complete Time: 21:24                                 [facility] 

                                                                                             

20:12 Order name: CBC with Diff; Complete Time: 22:05                                         [facility] 

                                                                                             

20:12 Order name: Creatinine for Radiology; Complete Time: 21:15                              [facility] 

                                                                                             

20:12 Order name: Hepatic Function; Complete Time: 21:24                                      [facility] 

                                                                                             

20:12 Order name: Blood Culture Adult (2)                                                     [facility] 

                                                                                             

20:12 Order name: Procalcitonin; Complete Time: 21:24                                         [facility] 

                                                                                             

20:12 Order name: Lactate; Complete Time: 21:15                                               [facility] 

                                                                                             

20:12 Order name: CPK; Complete Time: 21:24                                                   [facility] 

                                                                                             

20:12 Order name: Protime (+inr); Complete Time: 21:15                                        [facility] 

                                                                                             

21:51 Order name: CBC Smear Scan; Complete Time: 22:05                                        [facility]

                                                                                             

21:54 Order name: Basic Metabolic Panel                                                       [facility]

                                                                                             

21:54 Order name: Basic Metabolic Panel                                                       [facility]

                                                                                             

21:54 Order name: CBC with Automated Diff                                                     [facility]

                                                                                             

21:54 Order name: CBC with Automated Diff                                                     [facility]

                                                                                             

20:12 Order name: IV Saline Lock; Complete Time: 20:51                                        [facility] 

                                                                                             

20:12 Order name: Labs collected and sent; Complete Time: 20:51                                

                                                                                             

20:12 Order name: Accucheck; Complete Time: 20:49                                                                                                                                          

20:12 Order name: Cardiac monitoring; Complete Time: 20:42                                                                                                                                 

20:12 Order name: EKG - Nurse/Tech; Complete Time: 20:43                                       

                                                                                             

20:12 Order name: IV Saline Lock - Large Bore; Complete Time: 20:43                            

                                                                                             

20:12 Order name: O2 Per Protocol; Complete Time: 20:43                                                                                                                                    

20:12 Order name: O2 Sat Monitoring; Complete Time: 20:43                                     [facility] 

                                                                                             

21:54 Order name: CONS Pharmacy Consult                                                       [facility]

                                                                                             

21:54 Order name: CONS Pharmacy Consult                                                       [facility]

                                                                                             

21:54 Order name: CONS Physician Consult                                                      [facility]

                                                                                                  

Administered Medications:                                                                         

20:20 Drug: Tylenol 1000 mg Route: PO;                                                        ea  

22:04 Follow up: Response: No adverse reaction; Temperature is decreased                      ea  

20:42 Drug: NS 0.9% 1000 ml Route: IV; Rate: 1000 ml; Site: right antecubital;                ea  

21:50 Follow up: IV Status: Completed infusion; IV Intake: 1000ml                             ea  

21:16 Drug: NS 0.9% 1000 ml Route: IV; Rate: 1000 ml; Site: right hand;                       ea  

22:33 Follow up: IV Status: Completed infusion; IV Intake: 1000ml                             tl2 

21:23 Drug: Zosyn 3.375 grams Route: IVPB; Infused Over: 60 mins; Site: right hand;           ea  

22:33 Follow up: IV Status: Completed infusion; IV Intake: 100ml                              tl2 

22:42 Follow up: Response: No adverse reaction; IV Status: Completed infusion                 ea  

22:43 Drug: vancoMYCIN 1 grams Route: IVPB; Infused Over: 2 hrs; Site: right hand;            ea  

22:47 Follow up: Response: No adverse reaction; IV Status: Infusion continued upon admission  ea  

                                                                                                  

                                                                                                  

Point of Care Testing:                                                                            

      Blood Glucose:                                                                              

20:45 Blood Glucose: 184 mg/dL;                                                               ea  

      Ranges:                                                                                     

      Critical Glucose Levels:Adult <50 mg/dl or >400 mg/dl  <40 mg/dl or >180 mg/dl       

Disposition:                                                                                      

19 20:57 Hospitalization ordered by Jeronimo, Mohammad for Inpatient Admission. Preliminary     

  diagnosis are Cellulitis of right lower limb, Sepsis, Open wound of foot.                       

- Bed requested for Telemetry/MedSurg (Inpatient).                                                

- Status is Inpatient Admission.                                                              ea  

- Condition is Fair.                                                                              

- Problem is new.                                                                                 

- Symptoms are unchanged.                                                                         

UTI on Admission? No                                                                              

                                                                                                  

                                                                                                  

                                                                                                  

Addendum:                                                                                         

2019                                                                                        

     11:07 Co-signature as Attending Physician, Huey Bangura MD I agree with the assessment and  c
ha

           plan of care.                                                                          

                                                                                                  

Signatures:                                                                                       

Dispatcher [employer]                           EDMS                                                 

Huey Bangura MD MD cha Chretien, Felicia RN                   RN   fc                                                   

Rell Mathis PA PA   jr8                                                  

Komal Kramer RN                      Benson Collins ea, RN                    RN   jSidra Saldana RN   tl2                                                  

                                                                                                  

Corrections: (The following items were deleted from the chart)                                    

                                                                                             

22:07 20:57 Hospitalization Ordered by Kolby Jeronimo MD for Inpatient Admission. Preliminary  fc  

      diagnosis is Cellulitis of right lower limb; Sepsis; Open wound of foot. Bed requested      

      for Telemetry/MedSurg (Inpatient). Status is Inpatient Admission. Condition is Fair.        

      Problem is new. Symptoms are unchanged. UTI on Admission? No. jr8                           

22:57 22:07 2019 20:57 Hospitalization Ordered by Kolby Jeronimo MD for Inpatient        ea  

      Admission. Preliminary diagnosis is Cellulitis of right lower limb; Sepsis; Open wound      

      of foot. Bed requested for Telemetry/MedSurg (Inpatient). Status is Inpatient               

      Admission. Condition is Fair. Problem is new. Symptoms are unchanged. UTI on Admission?     

      No. fc                                                                                      

                                                                                                  

**************************************************************************************************

## 2019-03-02 LAB
BUN BLD-MCNC: 20 MG/DL (ref 7–18)
GLUCOSE SERPLBLD-MCNC: 164 MG/DL (ref 74–106)
HCT VFR BLD CALC: 30.8 % (ref 36–45)
LYMPHOCYTES # SPEC AUTO: 1.8 K/UL (ref 0.7–4.9)
PMV BLD: 7.2 FL (ref 7.6–11.3)
POTASSIUM SERPL-SCNC: 3.9 MMOL/L (ref 3.5–5.1)
RBC # BLD: 3.35 M/UL (ref 3.86–4.86)
UA COMPLETE W REFLEX CULTURE PNL UR: (no result)
UA DIPSTICK W REFLEX MICRO PNL UR: (no result)

## 2019-03-02 PROCEDURE — B548ZZA ULTRASONOGRAPHY OF SUPERIOR VENA CAVA, GUIDANCE: ICD-10-PCS

## 2019-03-02 PROCEDURE — 02HV33Z INSERTION OF INFUSION DEVICE INTO SUPERIOR VENA CAVA, PERCUTANEOUS APPROACH: ICD-10-PCS

## 2019-03-02 RX ADMIN — Medication SCH ML: at 21:24

## 2019-03-02 RX ADMIN — Medication SCH ML: at 08:37

## 2019-03-02 RX ADMIN — MORPHINE SULFATE PRN MG: 4 INJECTION, SOLUTION INTRAMUSCULAR; INTRAVENOUS at 23:52

## 2019-03-02 RX ADMIN — SODIUM CHLORIDE SCH MLS: 9 INJECTION, SOLUTION INTRAVENOUS at 18:16

## 2019-03-02 RX ADMIN — MORPHINE SULFATE PRN MG: 4 INJECTION, SOLUTION INTRAMUSCULAR; INTRAVENOUS at 08:41

## 2019-03-02 RX ADMIN — MORPHINE SULFATE PRN MG: 4 INJECTION, SOLUTION INTRAMUSCULAR; INTRAVENOUS at 03:42

## 2019-03-02 RX ADMIN — SODIUM CHLORIDE SCH MLS: 0.9 INJECTION, SOLUTION INTRAVENOUS at 00:05

## 2019-03-02 RX ADMIN — MORPHINE SULFATE PRN MG: 4 INJECTION, SOLUTION INTRAMUSCULAR; INTRAVENOUS at 18:40

## 2019-03-02 RX ADMIN — MORPHINE SULFATE PRN MG: 4 INJECTION, SOLUTION INTRAMUSCULAR; INTRAVENOUS at 00:30

## 2019-03-02 RX ADMIN — SODIUM CHLORIDE SCH: 0.9 INJECTION, SOLUTION INTRAVENOUS at 17:09

## 2019-03-02 RX ADMIN — SODIUM CHLORIDE SCH: 0.9 INJECTION, SOLUTION INTRAVENOUS at 08:00

## 2019-03-02 RX ADMIN — COLLAGENASE SANTYL SCH APPL: 250 OINTMENT TOPICAL at 13:15

## 2019-03-02 NOTE — P.HP
Certification for Inpatient


Patient admitted to: Inpatient


With expected LOS: >2 Midnights


Patient will require the following post-hospital care: None


Practitioner: I am a practitioner with admitting privileges, knowledge of 

patient current condition, hospital course, and medical plan of care.


Services: Services provided to patient in accordance with Admission 

requirements found in Title 42 Section 412.3 of the Code of Federal Regulations





Patient History


Date of Service: 03/01/19


Reason for admission: Diabetic foot/osteomyelitis


History of Present Illness: 





Patient is a 68-year-old female who came into the hospital with worsening right 

heel diabetic foot ulcer.  She had a debridement done about a week ago.  However

, there wound continues to worsen.  There is worsening odor and pain. She has 

streaking up the right leg up to her shin.  Initially there was no bony 

involvement however there is concern that at this point she may have 

osteomyelitis that has developed.  She will be admitted to the hospital for 

continued IV antibiotic therapy and will get General surgery to see the 

patient.  She may need long-term care as she has numerous comorbidities as 

well.  There is a history of peripheral arterial disease and patient apparently 

has poor arterial circulation which has prevented the wound from healing.  This 

may need aggressive care going further otherwise she may need calcaneal 

amputation.


Home Medications: 








Ciprofloxacin HCl 1 tab PO BID 03/01/19 


Doxycycline Hyclate 1 tab PO BID 03/01/19 


Lidocaine HCl [Aspercreme] 1 brittani TOP SEECOM 03/01/19 


Metformin ER [Glucophage ER*] 1 tab PO BID 03/01/19 


Multivitamin [Multivitamins] 1 each PO DAILY 03/01/19 


Triamterene/Hydrochlorothiazid [Triamterene-Hctz 75-50 mg Tab] 1 tab PO DAILY 03 /01/19 


Tryptophan [l-Tryptophan] 1 tab PO QID PRN 03/01/19 


clonazePAM [Clonazepam] 1 tab PO TID PRN 03/01/19 








- Past Medical/Surgical History


Has patient received pneumonia vaccine in the past: Yes


Diabetic: Yes


-: lymphedema


-: diabetic


-: hypothyroid


-: psoriasis


-: anxiety


-: tonsilectomy


-: c-sdection


-: right foot surgery





- Family History


  ** Father


Family History: Reviewed- Non-Contributory





- Social History


Smoking Status: Current every day smoker


Alcohol use: Yes


CD- Drugs: No


Caffeine use: No


Place of Residence: Home





Review of Systems


10-point ROS is otherwise unremarkable





Physical Examination





- Vital Signs


Temperature: 98.5 F


Blood Pressure: 103/51


Pulse: 98


Respirations: 20


Pulse Ox (%): 92





- Physical Exam


General: Alert, In no apparent distress, Oriented x3


HEENT: Atraumatic, Normocephalic


Neck: Supple, 2+ carotid pulse no bruit, JVD not distended, No Thyromegaly, No 

LAD


Respiratory: Clear to auscultation bilaterally, Normal air movement


Cardiovascular: Regular rate/rhythm, Normal S1 S2, No murmurs, Other (

Tachyarrhythmia), Abnormal pulses


Gastrointestinal: Normal bowel sounds, Hypoactive, Soft and benign, Non-

distended, No rebound, No guarding


Musculoskeletal: No clubbing, No contractures, Swelling


Integumentary: Skin breakdown, Skin lesion, Tenderness/swelling, Erythema (

Streaking up to the right knee), Diabetic ulcer, Arterial ulcer


Neurological: Normal speech, Normal strength at 5/5 x4 extr, Normal tone, 

Sensation intact, Cranial nerves 3-12 intact, Abnormal gait


Lymphatics: No axilla or inguinal lymphadenopathy





- Studies


Laboratory Data (last 24 hrs)





03/01/19 20:58: PT 19.4 H, INR 1.68


03/01/19 20:40: Creatinine 1.07


03/01/19 20:40: WBC 18.7 H D, Hgb 10.5 L, Hct 32.2 L, Plt Count 524 H


03/01/19 20:40: Sodium 135 L, Potassium 4.0, BUN 21 H, Creatinine 1.09, Glucose 

190 H, Total Bilirubin 0.5, AST 46 H, ALT 56, Alkaline Phosphatase 93








Assessment & Plan





- Problems (Diagnosis)


(1) Hypothyroid


Current Visit: No   Status: Acute   





(2) Lymphedema of both lower extremities


Current Visit: No   Status: Acute   





(3) Neuropathy


Onset Date: 01/31/19   Current Visit: No   Status: Acute   





(4) Osteomyelitis


Current Visit: No   Status: Acute   





(5) Spinal stenosis


Onset Date: 01/31/19   Current Visit: No   Status: Acute   





(6) Type 2 diabetes mellitus


Onset Date: 01/31/19   Current Visit: No   Status: Acute   





(7) Diabetic ulcer of right heel


Current Visit: No   Status: Chronic   


Qualifiers: 


   Diabetes mellitus type: type 2 





- Plan





1. Continue with IV antibiotic


2. Continue with local wound care


3. Wound care consultation/surgical consultation


4. Gentle IV hydration


5. Monitor CBC


6. Strict blood sugar monitoring


7. Pain control


8. May need MRI of the right foot


9. Sedimentation rate


10. May need transfer to LTAC


11. GI and DVT prophylaxis


Discharge Plan: Home


Plan to discharge in: Greater than 2 days





- Advance Directives


Does patient have a Living Will: No


Does patient have a Durable POA for Healthcare: No





- Code Status/Comfort Care


Code Status Assessed: Yes


Code Status: Full Code


Critical Care: No


Time Spent Managing PTS Care (In Minutes): 50

## 2019-03-02 NOTE — CON
Date of Consultation:  2019



Reason:  Infected wound, right heel.



History Of Present Illness:  The patient is a 68-year-old female, who initially saw approximately 9 d
ays ago in consultation by Dr. Tolbert for debridement of nonhealing right heel wound.  She did have pe
ripheral vascular disease.  Had a Doppler done, which showed stenosis in the right femoral artery.  S
he was supposed to see Dr. Sanchez in [place], but she does not want to go there.  She [facility]
 [facility], and the wound looked worse.  There was some bone expose
d.  The necrotic tissue was debrided at the bedside.  Cultures were sent, and on Thursday we got the 
report, it was E. coli not sensitive to the oral antibiotics the patient was on, so subsequently I co
ntacted the insurance company to see if we can get her direct admitted to and LTAC or a [facility] where they can provide hyperbaric interventional angiogram for her circulation, long-term IV anti
biotics and wound care in 1 facility, and the patient did agree to that, however, the insurance [employer] did not approve it.  They say that she has to come to the hospital, be admitted for 3 nights, and 
then they will decide, and please note, on her initial admission 3 weeks or 4 weeks ago, she was seen
 and evaluated by the [facility] as well as the surgeon and at that time, it was deemed not to need
 any urgent debridement. 



She currently is awake, alert.  She has neuropathy, so not much pain.  No purulent discharge, but the
re is an odor to the wound.  She states that the redness has gone up her leg and she has lost some we
ight secondary to fluid loss.  No fever or chills.



Review of Systems:

Otherwise unremarkable.



Past Medical History:  Significant for lymphedema, morbid obesity, hypothyroidism, type 2 diabetes, a
nxiety.



Past Surgical History:  Tonsillectomy, , right foot surgery.



Allergies:  REVIEWED.



Social History:  She does smoke. Has been counseled.  Drinks occasionally.



Family History:  Noncontributory.



Physical Examination:

Vital Signs:  Show slight tachycardia, otherwise afebrile. 

Head and Neck:  No masses. 

Chest:  Clear. 

Heart:  S1, S2. 

Abdomen:  Soft. 

Extremity:  Diminished dorsalis pedis and posterior tibial pulses.  On the right heel, there is an ap
proximately 8 x 8 cm area of necrosis around the edges, some bone exposed in the middle.  There is jeffries
rrounding erythema.  There is no purulence.  There is marked edema.



Laboratory Data:  Shows on admission white count is 18.7 with a left shift.  INR is 1.68.  Chemistry 
shows procalcitonin lactic acid to be within normal limits.  Her Doppler that was done on the 
ows moderate right-sided peripheral vascular disease with change in waveform noted at the level of th
e right SFA, which could indicate a focal stenosis.  The cultures from the [facility] srinath dutta essentially is E. coli, it not sensitive to Cipro, doxy, or Bactrim.  It is sensitive to meropene
m, and the patient will be started on that.



Assessment:  A 68-year-old female with multiple medical problems, right heel infected wound of foot w
ith osteomyelitis and severe peripheral vascular disease.



Recommendations:  We will go ahead and get PICC lines, put her on meropenem.  Wound Care as ordered, 
right now, collagenase wet-to-dry dressing.  We will take her to the OR for further debridement on Mo
nday.  The patient will need to be transferred to an LTAC for hyperbaric, and Cardiology will be cons
ulted to see if patient would 

benefit from an angiogram and improvement of blood flow.  Nutritional optimization and vitamins as or
dered.





AS/MODL

DD:  2019 13:06:16Voice ID:  [id number]

DT:  2019 15:33:00Report ID:  [id number]

## 2019-03-02 NOTE — RAD REPORT
EXAM DESCRIPTION:  RAD - Chest Single View - 3/2/2019 4:40 pm

 

CLINICAL HISTORY:   Device placement PICC line placement

 

FINDINGS:   A PICC line has been inserted with its tip in the superior vena cava.

 

The left lung base is hazy. Right lung appears clear.

 

The heart is mildly enlarged

 

IMPRESSION:   PICC line with its tip in the superior vena cava

 

Left base is hazy probably secondary to overlying soft tissue. If the patient has clinical symptoms t
o suggest an infiltrate then PA and lateral chest series would recommended

## 2019-03-03 RX ADMIN — Medication SCH ML: at 20:07

## 2019-03-03 RX ADMIN — COLLAGENASE SANTYL SCH APPL: 250 OINTMENT TOPICAL at 08:47

## 2019-03-03 RX ADMIN — SODIUM CHLORIDE SCH: 0.9 INJECTION, SOLUTION INTRAVENOUS at 04:00

## 2019-03-03 RX ADMIN — Medication SCH ML: at 08:47

## 2019-03-03 RX ADMIN — SODIUM CHLORIDE SCH MLS: 9 INJECTION, SOLUTION INTRAVENOUS at 16:37

## 2019-03-03 RX ADMIN — SODIUM CHLORIDE SCH MLS: 0.9 INJECTION, SOLUTION INTRAVENOUS at 00:18

## 2019-03-03 RX ADMIN — MORPHINE SULFATE PRN MG: 4 INJECTION, SOLUTION INTRAMUSCULAR; INTRAVENOUS at 20:07

## 2019-03-03 RX ADMIN — SODIUM CHLORIDE SCH MLS: 0.9 INJECTION, SOLUTION INTRAVENOUS at 13:03

## 2019-03-03 RX ADMIN — MAXZIDE SCH TAB: 37.5; 25 TABLET ORAL at 08:47

## 2019-03-03 RX ADMIN — MORPHINE SULFATE PRN MG: 4 INJECTION, SOLUTION INTRAMUSCULAR; INTRAVENOUS at 03:52

## 2019-03-03 RX ADMIN — SODIUM CHLORIDE SCH MLS: 9 INJECTION, SOLUTION INTRAVENOUS at 00:18

## 2019-03-03 RX ADMIN — MORPHINE SULFATE PRN MG: 4 INJECTION, SOLUTION INTRAMUSCULAR; INTRAVENOUS at 14:05

## 2019-03-03 RX ADMIN — MORPHINE SULFATE PRN MG: 4 INJECTION, SOLUTION INTRAMUSCULAR; INTRAVENOUS at 09:29

## 2019-03-03 RX ADMIN — SODIUM CHLORIDE SCH MLS: 9 INJECTION, SOLUTION INTRAVENOUS at 08:46

## 2019-03-03 NOTE — CON
Date of Consultation:  03/03/2019



Reason For Consultation:  Severe peripheral arterial disease.



History Of Present Illness:  Ms. Arenas is a 68-year-old white woman.  She has a history of anxiety,
 morbid obesity, diabetes, psoriasis, was admitted with a gangrenous left heel.  She recently had a d
ebridement in February of 2019 by Dr. Almazan, came back with osteomyelitis.  There is a plan to do mor
e debridement tomorrow.  She has a central line.  She is on meropenem for antibiotic.  There is a kraig
n for long-term acute care.  Arterial Doppler showed right SFA stenosis.  White count is about 15,000
, glucose of 150, hemoglobin of 9.8.  She had a normal echocardiogram in February 2019.  No cardiac h
istory.



Past Medical History:  Otherwise negative.



Allergies:  NONE.



Review of Systems:

Negative.



Social History:  Negative.



Family History:  Negative.



Medications:  Include ciprofloxacin, doxycycline, meropenem, metformin, triamterene with hydrochlorot
hiazide.



Physical Examination:

General:  Ms. Arenas is a very pleasant, in no acute distress. 

Vital Signs:  Stable.  Afebrile.  Weight is 260 pounds. 

HEENT:  Negative. 

Neck:  Supple without any bruit, lymphadenopathy, JVD, or thyromegaly. 

Chest:  Clear to auscultation and percussion. 

Cardiac:  Regular rhythm and rate with an S4 gallops and a tricuspid regurgitation murmur. 

Abdomen:  Obese, but benign. 

Extremities:  Gangrenous left heel.  Decreased pulses bilaterally. 

Skin:  Dry and intact. 

Neurological:  She was nonfocal.



Impression And Plan:  Severe peripheral arterial disease.  She may actually improve with intervention
 of her right SFA.  However, she is still on meropenem, elevated white count, still actively infected
 with osteomyelitis.  I agree with her debridement tomorrow.  She is cleared from a cardiovascular st
andpoint with that regard.  I certainly would like to do an angiography on Ms. Arenas using a left g
roin approach with an abdominal angiogram with runoff and a possible intervention.  I would however l
vianca to wait at least 4 weeks for the antibiotics to take effect regarding the osteomyelitis prior to 
doing that.  Ms. Arenas understands the plan.  I will eventually find her after her LTAC stay and I 
will discuss the case further with her primary care physician, Dr. Tolbert, and we will set her up for 
abdominal angiogram with runoff probably in about early April.  Her other problems seem to be stable 
at this point.





NB/SOFÍA

DD:  03/03/2019 12:36:45Voice ID:  [id number]

DT:  03/03/2019 13:25:10Report ID:  [id number]

## 2019-03-03 NOTE — P.PN
Subjective


Date of Service: 03/03/19


Chief Complaint: Diabetic foot/osteomyelitis





Patient seen and examined at bedside with RN.  Chart reviewed.  Case discussed 

with general surgery.  Patient currently is scheduled for surgical procedure 

with general surgery tomorrow morning.  Most likely will need long-term acute 

care for placement for IV antibiotics and daily wound care with preferably 

hyperbaric oxygenation.  Patient's wound culture positive for E. S. BL





Review of Systems


10-point ROS is otherwise unremarkable





Physical Examination





- Vital Signs


Temperature: 98.7 F


Blood Pressure: 112/55


Pulse: 115


Respirations: 18


Pulse Ox (%): 91





- Physical Exam


General: Alert, In no apparent distress


HEENT: Atraumatic, PERRLA, EOMI


Neck: Supple, JVD not distended


Respiratory: Clear to auscultation bilaterally, Normal air movement


Cardiovascular: Regular rate/rhythm, Normal S1 S2


Gastrointestinal: Normal bowel sounds, No tenderness


Musculoskeletal: Erythema, Tenderness, Warmth


Integumentary: Diabetic ulcer


Neurological: Normal speech, Normal tone, Normal affect


Lymphatics: No axilla or inguinal lymphadenopathy





- Studies


Medications List Reviewed: Yes





Assessment And Plan





- Current Problems (Diagnosis)


(1) Diabetic ulcer of right heel


Current Visit: No   Status: Acute   


Plan: 


Diabetic ulcer to the right foot.  With recent debridement.  Failed outpatient 

therapy.


-IV meropenem for wound culture positive for E. S. BL


-general surgery consulted.  Appreciated recommendations at this time


   -IV antibiotics and possible surgical procedure tomorrow a.m.


-will get blood culture, wound culture, urine culture repeated at this time


-wound care to the wound at this time


Qualifiers: 


   Diabetes mellitus type: type 2 





(2) Lymphedema of both lower extremities


Current Visit: No   Status: Chronic   





(3) Hypothyroid


Current Visit: No   Status: Chronic   


Qualifiers: 


   Hypothyroidism type: acquired   Qualified Code(s): E03.9 - Hypothyroidism, 

unspecified   





(4) Type 2 diabetes mellitus


Onset Date: 01/31/19   Current Visit: No   Status: Chronic   


Qualifiers: 


   Diabetes mellitus long term insulin use: with long term use   Diabetes 

mellitus complication status: with circulatory complication   Diabetes mellitus 

complication detail: with peripheral angiopathy with gangrene   Qualified Code(s

): E11.52 - Type 2 diabetes mellitus with diabetic peripheral angiopathy with 

gangrene; Z79.4 - Long term (current) use of insulin   





- Plan





Awaiting clinical improvement at this time.  Patient plan for surgical 

procedure tomorrow morning with general surgery.  Will most likely need long-

term acute care placement for wound care with hyperbaric And IV antibiotics.  

Will consult case management here in the hospital full


Discharge Plan: Home


Plan to discharge in: 48 Hours





- Code Status/Comfort Care


Code Status Assessed: Yes


Critical Care: No

## 2019-03-03 NOTE — EKG
Test Date:    2019-03-01               Test Time:    20:25:29

Technician:   CORAL                                     

                                                     

MEASUREMENT RESULTS:                                       

Intervals:                                           

Rate:         116                                    

NH:           280                                    

QRSD:         88                                     

QT:           294                                    

QTc:          408                                    

Axis:                                                

P:                                                   

NH:           280                                    

QRS:          -56                                    

T:            55                                     

                                                     

INTERPRETIVE STATEMENTS:                                       

                                                     

Sinus tachycardia with 1st degree AV block with premature supraventricular

complexes

Low voltage QRS

Left anterior fascicular block

Cannot rule out Anterior infarct, age undetermined

Abnormal ECG

Compared to ECG 02/18/2019 16:25:25

Atrial premature complex(es) now present

First degree AV block now present

Left anterior fascicular block now present

Atrial fibrillation no longer present

Right superior axis no longer present

Incomplete right bundle-branch block no longer present

Myocardial infarct finding still present



Electronically Signed On 03-03-19 10:12:42 CST by Diego Salinas

## 2019-03-04 PROCEDURE — 0QBL0ZZ EXCISION OF RIGHT TARSAL, OPEN APPROACH: ICD-10-PCS

## 2019-03-04 RX ADMIN — MORPHINE SULFATE PRN MG: 4 INJECTION, SOLUTION INTRAMUSCULAR; INTRAVENOUS at 13:57

## 2019-03-04 RX ADMIN — MORPHINE SULFATE PRN MG: 4 INJECTION, SOLUTION INTRAMUSCULAR; INTRAVENOUS at 08:38

## 2019-03-04 RX ADMIN — MORPHINE SULFATE PRN MG: 4 INJECTION, SOLUTION INTRAMUSCULAR; INTRAVENOUS at 04:48

## 2019-03-04 RX ADMIN — COLLAGENASE SANTYL SCH APPL: 250 OINTMENT TOPICAL at 08:12

## 2019-03-04 RX ADMIN — Medication SCH ML: at 21:06

## 2019-03-04 RX ADMIN — MAXZIDE SCH: 37.5; 25 TABLET ORAL at 08:11

## 2019-03-04 RX ADMIN — MORPHINE SULFATE PRN MG: 4 INJECTION, SOLUTION INTRAMUSCULAR; INTRAVENOUS at 00:11

## 2019-03-04 RX ADMIN — SODIUM CHLORIDE SCH MLS: 0.9 INJECTION, SOLUTION INTRAVENOUS at 13:49

## 2019-03-04 RX ADMIN — COLLAGENASE SANTYL SCH: 250 OINTMENT TOPICAL at 09:00

## 2019-03-04 RX ADMIN — SODIUM CHLORIDE SCH MLS: 9 INJECTION, SOLUTION INTRAVENOUS at 17:59

## 2019-03-04 RX ADMIN — SODIUM CHLORIDE SCH MLS: 9 INJECTION, SOLUTION INTRAVENOUS at 08:04

## 2019-03-04 RX ADMIN — Medication SCH ML: at 08:04

## 2019-03-04 RX ADMIN — SODIUM CHLORIDE SCH MLS: 9 INJECTION, SOLUTION INTRAVENOUS at 00:10

## 2019-03-04 RX ADMIN — SODIUM CHLORIDE SCH MLS: 0.9 INJECTION, SOLUTION INTRAVENOUS at 00:10

## 2019-03-04 RX ADMIN — MORPHINE SULFATE PRN MG: 4 INJECTION, SOLUTION INTRAMUSCULAR; INTRAVENOUS at 21:06

## 2019-03-04 RX ADMIN — SODIUM CHLORIDE SCH: 0.9 INJECTION, SOLUTION INTRAVENOUS at 09:34

## 2019-03-04 NOTE — P.PN
Subjective


Date of Service: 03/04/19


Chief Complaint: Diabetic foot/osteomyelitis


Subjective: No new changes





ELEUTERIO IS A SMOKER WITH PVD THAT FAILED TO IMPROVE ON ORAL MEDS FOR SUSPECTED 

OSTEOMYELITIS.  SHE COULD NOT DO MRI OR BONE SCAN AS SHE CAN'T LAY DOWN FORE 

TEST.  SHE UNDERWENT DEBRIDEMENT AGAIN TODAY AND WILL GO TO LTAC NOW.  





Review of Systems


10-point ROS is otherwise unremarkable


General: Weakness





Physical Examination





- Vital Signs


Temperature: 98.3 F


Blood Pressure: 118/64


Pulse: 99


Respirations: 20


Pulse Ox (%): 93





- Physical Exam


General: Alert, Mild distress, Obese


HEENT: Atraumatic, PERRLA, EOMI


Neck: Supple, JVD not distended


Respiratory: Clear to auscultation bilaterally, Normal air movement


Cardiovascular: Normal S1 S2, Edema


Gastrointestinal: Normal bowel sounds, No tenderness


Musculoskeletal: No tenderness


Integumentary: No rashes


Neurological: Normal speech, Normal tone, Normal affect


Lymphatics: No axilla or inguinal lymphadenopathy





- Studies


Medications List Reviewed: Yes





Assessment And Plan





- Current Problems (Diagnosis)


(1) PVD (peripheral vascular disease)


Current Visit: Yes   Status: Acute   


Plan: 


FROM SMOKING BUT IN DENIAL. 





SHE WILL GO TO LTAC.








(2) Diabetic ulcer of right heel


Onset Date: 03/04/19   Current Visit: Yes   Status: Chronic   


Plan: 


LTAC FOR IV ABX AND HBO IF NEED. 





STABLE FOR DC.


Qualifiers: 


   Diabetes mellitus type: type 2

## 2019-03-04 NOTE — P.OP
Assistant: POLI KILLIAN


Preoperative diagnosis: Infected R Heel wound eith Osteo and Severe PVD


Postoperative diagnosis: same


Primary procedure: Debridement R Heel wound 8x6 cm to sq, mm, and bone


Anesthesia: General


Estimated blood loss: min


Specimen: necrotic tissue


Findings: as above


Complications: None


Transferred to: Recovery Room


Condition: Good

## 2019-03-05 LAB
ANISOCYTOSIS BLD QL: (no result)
BLD SMEAR INTERP: (no result)
BUN BLD-MCNC: 24 MG/DL (ref 7–18)
GLUCOSE SERPLBLD-MCNC: 154 MG/DL (ref 74–106)
HCT VFR BLD CALC: 30 % (ref 36–45)
LYMPHOCYTES # SPEC AUTO: 1.8 K/UL (ref 0.7–4.9)
MAGNESIUM SERPL-MCNC: 1.9 MG/DL (ref 1.8–2.4)
MORPHOLOGY BLD-IMP: (no result)
PMV BLD: 7.5 FL (ref 7.6–11.3)
POLYCHROMASIA BLD QL SMEAR: (no result)
POTASSIUM SERPL-SCNC: 4.8 MMOL/L (ref 3.5–5.1)
RBC # BLD: 3.3 M/UL (ref 3.86–4.86)

## 2019-03-05 RX ADMIN — Medication SCH ML: at 08:21

## 2019-03-05 RX ADMIN — Medication SCH ML: at 20:44

## 2019-03-05 RX ADMIN — METFORMIN HYDROCHLORIDE SCH MG: 500 TABLET, EXTENDED RELEASE ORAL at 08:20

## 2019-03-05 RX ADMIN — MORPHINE SULFATE PRN MG: 4 INJECTION, SOLUTION INTRAMUSCULAR; INTRAVENOUS at 01:35

## 2019-03-05 RX ADMIN — MAXZIDE SCH TAB: 37.5; 25 TABLET ORAL at 08:20

## 2019-03-05 RX ADMIN — SODIUM CHLORIDE SCH MLS: 9 INJECTION, SOLUTION INTRAVENOUS at 17:29

## 2019-03-05 RX ADMIN — SODIUM CHLORIDE SCH MLS: 9 INJECTION, SOLUTION INTRAVENOUS at 08:20

## 2019-03-05 RX ADMIN — SODIUM CHLORIDE SCH MLS: 9 INJECTION, SOLUTION INTRAVENOUS at 01:04

## 2019-03-05 RX ADMIN — MORPHINE SULFATE PRN MG: 4 INJECTION, SOLUTION INTRAMUSCULAR; INTRAVENOUS at 23:51

## 2019-03-05 RX ADMIN — METFORMIN HYDROCHLORIDE SCH MG: 500 TABLET, EXTENDED RELEASE ORAL at 20:43

## 2019-03-05 RX ADMIN — MORPHINE SULFATE PRN MG: 4 INJECTION, SOLUTION INTRAMUSCULAR; INTRAVENOUS at 10:21

## 2019-03-05 RX ADMIN — COLLAGENASE SANTYL SCH APPL: 250 OINTMENT TOPICAL at 08:21

## 2019-03-05 RX ADMIN — MORPHINE SULFATE PRN MG: 4 INJECTION, SOLUTION INTRAMUSCULAR; INTRAVENOUS at 17:28

## 2019-03-05 NOTE — PN
Date of Progress Note:  03/05/2019



Subjective:  The patient is awake, alert.  No complaints.



Objective:  Vital Signs:  Stable, afebrile. 

Extremities:  Dressing clean, dry, intact.



Assessment:  Status post debridement of right heel, the patient with osteomyelitis, peripheral vascul
ar disease.



Recommendations:  Continue IV antibiotics.  Wound care was ordered.  Being evaluated for LTAC.  She w
ill benefit from Cardiology intervention as well as hyperbarics.





AS/MODL

DD:  03/05/2019 13:36:15Voice ID:  [id number]

DT:  03/05/2019 18:12:06Report ID:  [id number]

## 2019-03-05 NOTE — P.PN
Subjective


Date of Service: 03/05/19


Chief Complaint: Diabetic foot/osteomyelitis


Subjective: No C/O voiced, Improving





ELEUTERIO IS A SMOKER WITH PVD THAT FAILED TO IMPROVE ON ORAL MEDS FOR SUSPECTED 

OSTEOMYELITIS.  SHE COULD NOT DO MRI OR BONE SCAN AS SHE CAN'T LAY DOWN FORE 

TEST.  SHE UNDERWENT DEBRIDEMENT AGAIN TODAY AND WILL GO TO LTAC NOW.  





SHE IS STABLE. NO CHANGES. ON IV ABX.  





Review of Systems


10-point ROS is otherwise unremarkable





Physical Examination





- Vital Signs


Temperature: 98 F


Blood Pressure: 137/80


Pulse: 119


Respirations: 20


Pulse Ox (%): 91





- Physical Exam


General: Alert, Obese


HEENT: Atraumatic, PERRLA, EOMI


Neck: Supple, JVD not distended


Respiratory: Clear to auscultation bilaterally, Normal air movement


Cardiovascular: Regular rate/rhythm, Normal S1 S2


Gastrointestinal: Normal bowel sounds, No tenderness


Musculoskeletal: No tenderness, Other (RIGHT LEG WRAPPED SP SURGERY)


Integumentary: No rashes


Neurological: Normal speech, Normal tone, Normal affect


Lymphatics: No axilla or inguinal lymphadenopathy





- Studies


Medications List Reviewed: Yes





Assessment And Plan





- Current Problems (Diagnosis)


(1) PVD (peripheral vascular disease)


Current Visit: Yes   Status: Acute   


Plan: 


FROM SMOKING BUT IN DENIAL. 





SHE WILL GO TO LTAC.








(2) Diabetic ulcer of right heel


Onset Date: 03/04/19   Current Visit: Yes   Status: Chronic   


Plan: 


LTAC FOR IV ABX AND HBO IF NEED. 





STABLE FOR DC.





WE ARE WAITING FOR INSURANCE APPROVAL FOR ANIRUD[facility].





PROGNOSIS GUARDED.


Qualifiers: 


   Diabetes mellitus type: type 2

## 2019-03-06 VITALS — TEMPERATURE: 97.9 F | DIASTOLIC BLOOD PRESSURE: 78 MMHG | SYSTOLIC BLOOD PRESSURE: 123 MMHG

## 2019-03-06 VITALS — OXYGEN SATURATION: 93 %

## 2019-03-06 RX ADMIN — MORPHINE SULFATE PRN MG: 4 INJECTION, SOLUTION INTRAMUSCULAR; INTRAVENOUS at 05:01

## 2019-03-06 RX ADMIN — COLLAGENASE SANTYL SCH APPL: 250 OINTMENT TOPICAL at 09:00

## 2019-03-06 RX ADMIN — METFORMIN HYDROCHLORIDE SCH MG: 500 TABLET, EXTENDED RELEASE ORAL at 20:54

## 2019-03-06 RX ADMIN — Medication SCH ML: at 20:54

## 2019-03-06 RX ADMIN — MORPHINE SULFATE PRN MG: 4 INJECTION, SOLUTION INTRAMUSCULAR; INTRAVENOUS at 15:20

## 2019-03-06 RX ADMIN — MORPHINE SULFATE PRN MG: 4 INJECTION, SOLUTION INTRAMUSCULAR; INTRAVENOUS at 10:57

## 2019-03-06 RX ADMIN — METFORMIN HYDROCHLORIDE SCH MG: 500 TABLET, EXTENDED RELEASE ORAL at 09:13

## 2019-03-06 RX ADMIN — SODIUM CHLORIDE SCH MLS: 9 INJECTION, SOLUTION INTRAVENOUS at 09:13

## 2019-03-06 RX ADMIN — SODIUM CHLORIDE SCH MLS: 9 INJECTION, SOLUTION INTRAVENOUS at 16:22

## 2019-03-06 RX ADMIN — SODIUM CHLORIDE SCH MLS: 9 INJECTION, SOLUTION INTRAVENOUS at 01:14

## 2019-03-06 RX ADMIN — MAXZIDE SCH TAB: 37.5; 25 TABLET ORAL at 09:13

## 2019-03-06 RX ADMIN — Medication SCH ML: at 09:13

## 2019-03-06 RX ADMIN — MORPHINE SULFATE PRN MG: 4 INJECTION, SOLUTION INTRAMUSCULAR; INTRAVENOUS at 21:06

## 2019-03-06 NOTE — P.PN
Subjective


Date of Service: 03/06/19


Chief Complaint: Diabetic foot/osteomyelitis





ELEUTERIO IS A SMOKER WITH PVD THAT FAILED TO IMPROVE ON ORAL MEDS FOR SUSPECTED 

OSTEOMYELITIS.  SHE COULD NOT DO MRI OR BONE SCAN AS SHE CAN'T LAY DOWN FORE 

TEST.  SHE UNDERWENT DEBRIDEMENT AGAIN TODAY AND WILL GO TO LTAC NOW.  





SHE IS STABLE. NO CHANGES. ON IV ABX.  


STABLE,  WE ARE WAITING FOR INSURANCE .





Review of Systems


10-point ROS is otherwise unremarkable





Physical Examination





- Vital Signs


Temperature: 97.9 F


Blood Pressure: 123/78


Pulse: 91


Respirations: 20


Pulse Ox (%): 96





- Physical Exam


General: Alert, Obese


HEENT: Atraumatic, PERRLA, EOMI


Neck: Supple, JVD not distended


Respiratory: Clear to auscultation bilaterally, Normal air movement


Cardiovascular: Regular rate/rhythm, Normal S1 S2


Gastrointestinal: Normal bowel sounds, No tenderness


Musculoskeletal: No tenderness


Integumentary: No rashes


Neurological: Normal speech, Normal tone, Normal affect


Lymphatics: No axilla or inguinal lymphadenopathy





- Studies


Microbiology Data (last 24 hrs): 








03/01/19 20:40   Blood  - Blood   Aerobic Blood Culture - Final


03/01/19 20:40   Blood  - Blood   Gram Stain - Final


03/01/19 20:40   Blood  - Blood   Anaerobic Blood Culture - Final


                            No growth in 5 days.





Medications List Reviewed: Yes





Assessment And Plan





- Current Problems (Diagnosis)


(1) PVD (peripheral vascular disease)


Current Visit: Yes   Status: Acute   


Plan: 


FROM SMOKING BUT IN DENIAL. 





SHE WILL GO TO LTAC.





NOW WITH INFECTION OF THE HEEL SHE CAN'T GO FOR CATH. WITH THE INFECTION IN THE 

HEEL.








(2) Diabetic ulcer of right heel


Onset Date: 03/04/19   Current Visit: Yes   Status: Chronic   


Plan: 


LTAC FOR IV ABX AND HBO IF NEED. 





STABLE FOR DC.





WE ARE WAITING FOR INSURANCE APPROVAL FOR ANIRUD[facility].





PROGNOSIS GUARDED.


Qualifiers: 


   Diabetes mellitus type: type 2